# Patient Record
Sex: MALE | Race: WHITE | Employment: FULL TIME | URBAN - METROPOLITAN AREA
[De-identification: names, ages, dates, MRNs, and addresses within clinical notes are randomized per-mention and may not be internally consistent; named-entity substitution may affect disease eponyms.]

---

## 2019-10-11 ENCOUNTER — HOSPITAL ENCOUNTER (OUTPATIENT)
Age: 64
Setting detail: OUTPATIENT SURGERY
Discharge: HOME | End: 2019-10-11
Attending: UROLOGY | Admitting: UROLOGY
Payer: MEDICARE

## 2019-10-11 ENCOUNTER — ANESTHESIA EVENT (OUTPATIENT)
Dept: OPERATING ROOM | Age: 64
End: 2019-10-11
Payer: MEDICARE

## 2019-10-11 ENCOUNTER — APPOINTMENT (OUTPATIENT)
Dept: GENERAL RADIOLOGY | Age: 64
End: 2019-10-11
Payer: MEDICARE

## 2019-10-11 ENCOUNTER — HOSPITAL ENCOUNTER (EMERGENCY)
Age: 64
Discharge: HOME | End: 2019-10-12
Attending: EMERGENCY MEDICINE
Payer: MEDICARE

## 2019-10-11 ENCOUNTER — ANESTHESIA (OUTPATIENT)
Dept: OPERATING ROOM | Age: 64
End: 2019-10-11
Payer: MEDICARE

## 2019-10-11 ENCOUNTER — APPOINTMENT (OUTPATIENT)
Dept: GENERAL RADIOLOGY | Age: 64
End: 2019-10-11
Attending: UROLOGY
Payer: MEDICARE

## 2019-10-11 VITALS
RESPIRATION RATE: 16 BRPM | HEART RATE: 57 BPM | DIASTOLIC BLOOD PRESSURE: 72 MMHG | TEMPERATURE: 98.2 F | OXYGEN SATURATION: 99 % | SYSTOLIC BLOOD PRESSURE: 150 MMHG

## 2019-10-11 VITALS — SYSTOLIC BLOOD PRESSURE: 133 MMHG | OXYGEN SATURATION: 100 % | DIASTOLIC BLOOD PRESSURE: 79 MMHG | TEMPERATURE: 96.8 F

## 2019-10-11 DIAGNOSIS — G89.18 POSTOPERATIVE PAIN: Primary | ICD-10-CM

## 2019-10-11 DIAGNOSIS — R30.0 DYSURIA: Primary | ICD-10-CM

## 2019-10-11 PROCEDURE — C1769 GUIDE WIRE: HCPCS | Performed by: UROLOGY

## 2019-10-11 PROCEDURE — 6360000002 HC RX W HCPCS: Performed by: SPECIALIST

## 2019-10-11 PROCEDURE — 2709999900 HC NON-CHARGEABLE SUPPLY: Performed by: UROLOGY

## 2019-10-11 PROCEDURE — 6360000002 HC RX W HCPCS: Performed by: STUDENT IN AN ORGANIZED HEALTH CARE EDUCATION/TRAINING PROGRAM

## 2019-10-11 PROCEDURE — 81001 URINALYSIS AUTO W/SCOPE: CPT

## 2019-10-11 PROCEDURE — 2580000003 HC RX 258: Performed by: SPECIALIST

## 2019-10-11 PROCEDURE — 2580000003 HC RX 258: Performed by: ANESTHESIOLOGY

## 2019-10-11 PROCEDURE — 2500000003 HC RX 250 WO HCPCS: Performed by: SPECIALIST

## 2019-10-11 PROCEDURE — 6370000000 HC RX 637 (ALT 250 FOR IP): Performed by: STUDENT IN AN ORGANIZED HEALTH CARE EDUCATION/TRAINING PROGRAM

## 2019-10-11 PROCEDURE — 99283 EMERGENCY DEPT VISIT LOW MDM: CPT

## 2019-10-11 PROCEDURE — 3700000001 HC ADD 15 MINUTES (ANESTHESIA): Performed by: UROLOGY

## 2019-10-11 PROCEDURE — 93005 ELECTROCARDIOGRAM TRACING: CPT

## 2019-10-11 PROCEDURE — 3600000014 HC SURGERY LEVEL 4 ADDTL 15MIN: Performed by: UROLOGY

## 2019-10-11 PROCEDURE — 7100000000 HC PACU RECOVERY - FIRST 15 MIN: Performed by: UROLOGY

## 2019-10-11 PROCEDURE — 2580000003 HC RX 258: Performed by: STUDENT IN AN ORGANIZED HEALTH CARE EDUCATION/TRAINING PROGRAM

## 2019-10-11 PROCEDURE — 7100000040 HC SPAR PHASE II RECOVERY - FIRST 15 MIN: Performed by: UROLOGY

## 2019-10-11 PROCEDURE — 74018 RADEX ABDOMEN 1 VIEW: CPT

## 2019-10-11 PROCEDURE — 2580000003 HC RX 258: Performed by: UROLOGY

## 2019-10-11 PROCEDURE — 96374 THER/PROPH/DIAG INJ IV PUSH: CPT

## 2019-10-11 PROCEDURE — 96361 HYDRATE IV INFUSION ADD-ON: CPT

## 2019-10-11 PROCEDURE — C2617 STENT, NON-COR, TEM W/O DEL: HCPCS | Performed by: UROLOGY

## 2019-10-11 PROCEDURE — C1758 CATHETER, URETERAL: HCPCS | Performed by: UROLOGY

## 2019-10-11 PROCEDURE — 7100000001 HC PACU RECOVERY - ADDTL 15 MIN: Performed by: UROLOGY

## 2019-10-11 PROCEDURE — 2720000010 HC SURG SUPPLY STERILE: Performed by: UROLOGY

## 2019-10-11 PROCEDURE — 3700000000 HC ANESTHESIA ATTENDED CARE: Performed by: UROLOGY

## 2019-10-11 PROCEDURE — 3600000004 HC SURGERY LEVEL 4 BASE: Performed by: UROLOGY

## 2019-10-11 DEVICE — URETERAL STENT
Type: IMPLANTABLE DEVICE | Site: URETER | Status: FUNCTIONAL
Brand: POLARIS™ ULTRA

## 2019-10-11 RX ORDER — TAMSULOSIN HYDROCHLORIDE 0.4 MG/1
0.4 CAPSULE ORAL DAILY
Qty: 30 CAPSULE | Refills: 0 | Status: ON HOLD | OUTPATIENT
Start: 2019-10-11 | End: 2020-12-10 | Stop reason: HOSPADM

## 2019-10-11 RX ORDER — LIDOCAINE HYDROCHLORIDE 10 MG/ML
INJECTION, SOLUTION EPIDURAL; INFILTRATION; INTRACAUDAL; PERINEURAL PRN
Status: DISCONTINUED | OUTPATIENT
Start: 2019-10-11 | End: 2019-10-11 | Stop reason: SDUPTHER

## 2019-10-11 RX ORDER — FENTANYL CITRATE 50 UG/ML
50 INJECTION, SOLUTION INTRAMUSCULAR; INTRAVENOUS EVERY 5 MIN PRN
Status: DISCONTINUED | OUTPATIENT
Start: 2019-10-11 | End: 2019-10-11 | Stop reason: HOSPADM

## 2019-10-11 RX ORDER — HYDROCODONE BITARTRATE AND ACETAMINOPHEN 5; 325 MG/1; MG/1
1 TABLET ORAL PRN
Status: DISCONTINUED | OUTPATIENT
Start: 2019-10-11 | End: 2019-10-11 | Stop reason: HOSPADM

## 2019-10-11 RX ORDER — MAGNESIUM HYDROXIDE 1200 MG/15ML
LIQUID ORAL CONTINUOUS PRN
Status: COMPLETED | OUTPATIENT
Start: 2019-10-11 | End: 2019-10-11

## 2019-10-11 RX ORDER — ATORVASTATIN CALCIUM 10 MG/1
10 TABLET, FILM COATED ORAL DAILY
COMMUNITY

## 2019-10-11 RX ORDER — FENTANYL CITRATE 50 UG/ML
INJECTION, SOLUTION INTRAMUSCULAR; INTRAVENOUS PRN
Status: DISCONTINUED | OUTPATIENT
Start: 2019-10-11 | End: 2019-10-11 | Stop reason: SDUPTHER

## 2019-10-11 RX ORDER — FENTANYL CITRATE 50 UG/ML
25 INJECTION, SOLUTION INTRAMUSCULAR; INTRAVENOUS EVERY 5 MIN PRN
Status: DISCONTINUED | OUTPATIENT
Start: 2019-10-11 | End: 2019-10-11 | Stop reason: HOSPADM

## 2019-10-11 RX ORDER — SODIUM CHLORIDE 0.9 % (FLUSH) 0.9 %
10 SYRINGE (ML) INJECTION PRN
Status: DISCONTINUED | OUTPATIENT
Start: 2019-10-11 | End: 2019-10-11 | Stop reason: HOSPADM

## 2019-10-11 RX ORDER — TAMSULOSIN HYDROCHLORIDE 0.4 MG/1
0.4 CAPSULE ORAL DAILY
Status: ON HOLD | COMMUNITY
End: 2019-10-11 | Stop reason: HOSPADM

## 2019-10-11 RX ORDER — DOCUSATE SODIUM 100 MG/1
100 CAPSULE, LIQUID FILLED ORAL 2 TIMES DAILY
Qty: 60 CAPSULE | Refills: 0 | Status: SHIPPED | OUTPATIENT
Start: 2019-10-11 | End: 2019-11-10

## 2019-10-11 RX ORDER — POTASSIUM CITRATE 5 MEQ/1
10 TABLET, EXTENDED RELEASE ORAL 2 TIMES DAILY
COMMUNITY

## 2019-10-11 RX ORDER — CEPHALEXIN 500 MG/1
500 CAPSULE ORAL 3 TIMES DAILY
Qty: 9 CAPSULE | Refills: 0 | Status: SHIPPED | OUTPATIENT
Start: 2019-10-11 | End: 2019-10-14

## 2019-10-11 RX ORDER — HYDROCODONE BITARTRATE AND ACETAMINOPHEN 5; 325 MG/1; MG/1
1 TABLET ORAL EVERY 6 HOURS PRN
Status: ON HOLD | COMMUNITY
End: 2019-10-11 | Stop reason: HOSPADM

## 2019-10-11 RX ORDER — 0.9 % SODIUM CHLORIDE 0.9 %
1000 INTRAVENOUS SOLUTION INTRAVENOUS ONCE
Status: COMPLETED | OUTPATIENT
Start: 2019-10-11 | End: 2019-10-12

## 2019-10-11 RX ORDER — KETOROLAC TROMETHAMINE 15 MG/ML
15 INJECTION, SOLUTION INTRAMUSCULAR; INTRAVENOUS ONCE
Status: COMPLETED | OUTPATIENT
Start: 2019-10-11 | End: 2019-10-11

## 2019-10-11 RX ORDER — AMLODIPINE BESYLATE 5 MG/1
5 TABLET ORAL DAILY
COMMUNITY

## 2019-10-11 RX ORDER — SODIUM CHLORIDE 0.9 % (FLUSH) 0.9 %
10 SYRINGE (ML) INJECTION EVERY 12 HOURS SCHEDULED
Status: DISCONTINUED | OUTPATIENT
Start: 2019-10-11 | End: 2019-10-11 | Stop reason: HOSPADM

## 2019-10-11 RX ORDER — PROPOFOL 10 MG/ML
INJECTION, EMULSION INTRAVENOUS PRN
Status: DISCONTINUED | OUTPATIENT
Start: 2019-10-11 | End: 2019-10-11 | Stop reason: SDUPTHER

## 2019-10-11 RX ORDER — HYDROCODONE BITARTRATE AND ACETAMINOPHEN 5; 325 MG/1; MG/1
1 TABLET ORAL EVERY 6 HOURS PRN
Qty: 15 TABLET | Refills: 0 | Status: SHIPPED | OUTPATIENT
Start: 2019-10-11 | End: 2019-10-16

## 2019-10-11 RX ORDER — OXYCODONE HYDROCHLORIDE 5 MG/1
5 TABLET ORAL ONCE
Status: COMPLETED | OUTPATIENT
Start: 2019-10-11 | End: 2019-10-11

## 2019-10-11 RX ORDER — HYDROCODONE BITARTRATE AND ACETAMINOPHEN 5; 325 MG/1; MG/1
2 TABLET ORAL PRN
Status: DISCONTINUED | OUTPATIENT
Start: 2019-10-11 | End: 2019-10-11 | Stop reason: HOSPADM

## 2019-10-11 RX ORDER — ACETAMINOPHEN 325 MG/1
650 TABLET ORAL EVERY 6 HOURS PRN
COMMUNITY

## 2019-10-11 RX ORDER — SODIUM CHLORIDE, SODIUM LACTATE, POTASSIUM CHLORIDE, CALCIUM CHLORIDE 600; 310; 30; 20 MG/100ML; MG/100ML; MG/100ML; MG/100ML
INJECTION, SOLUTION INTRAVENOUS CONTINUOUS
Status: DISCONTINUED | OUTPATIENT
Start: 2019-10-11 | End: 2019-10-11 | Stop reason: HOSPADM

## 2019-10-11 RX ORDER — SODIUM CHLORIDE, SODIUM LACTATE, POTASSIUM CHLORIDE, CALCIUM CHLORIDE 600; 310; 30; 20 MG/100ML; MG/100ML; MG/100ML; MG/100ML
INJECTION, SOLUTION INTRAVENOUS CONTINUOUS PRN
Status: DISCONTINUED | OUTPATIENT
Start: 2019-10-11 | End: 2019-10-11 | Stop reason: SDUPTHER

## 2019-10-11 RX ORDER — ATENOLOL 25 MG/1
25 TABLET ORAL DAILY
COMMUNITY

## 2019-10-11 RX ADMIN — SODIUM CHLORIDE 1000 ML: 9 INJECTION, SOLUTION INTRAVENOUS at 23:34

## 2019-10-11 RX ADMIN — FENTANYL CITRATE 50 MCG: 50 INJECTION INTRAMUSCULAR; INTRAVENOUS at 10:47

## 2019-10-11 RX ADMIN — SODIUM CHLORIDE, POTASSIUM CHLORIDE, SODIUM LACTATE AND CALCIUM CHLORIDE: 600; 310; 30; 20 INJECTION, SOLUTION INTRAVENOUS at 09:30

## 2019-10-11 RX ADMIN — OXYCODONE HYDROCHLORIDE 5 MG: 5 TABLET ORAL at 23:38

## 2019-10-11 RX ADMIN — Medication 2 G: at 10:49

## 2019-10-11 RX ADMIN — SODIUM CHLORIDE, POTASSIUM CHLORIDE, SODIUM LACTATE AND CALCIUM CHLORIDE: 600; 310; 30; 20 INJECTION, SOLUTION INTRAVENOUS at 10:38

## 2019-10-11 RX ADMIN — PROPOFOL 200 MG: 10 INJECTION, EMULSION INTRAVENOUS at 10:47

## 2019-10-11 RX ADMIN — FENTANYL CITRATE 25 MCG: 50 INJECTION INTRAMUSCULAR; INTRAVENOUS at 11:11

## 2019-10-11 RX ADMIN — KETOROLAC TROMETHAMINE 15 MG: 15 INJECTION, SOLUTION INTRAMUSCULAR; INTRAVENOUS at 23:34

## 2019-10-11 RX ADMIN — LIDOCAINE HYDROCHLORIDE 50 MG: 10 INJECTION, SOLUTION EPIDURAL; INFILTRATION; INTRACAUDAL; PERINEURAL at 10:47

## 2019-10-11 SDOH — HEALTH STABILITY: MENTAL HEALTH: HOW OFTEN DO YOU HAVE A DRINK CONTAINING ALCOHOL?: NEVER

## 2019-10-11 ASSESSMENT — PULMONARY FUNCTION TESTS
PIF_VALUE: 0
PIF_VALUE: 18
PIF_VALUE: 23
PIF_VALUE: 18
PIF_VALUE: 17
PIF_VALUE: 12
PIF_VALUE: 19
PIF_VALUE: 20
PIF_VALUE: 20
PIF_VALUE: 2
PIF_VALUE: 16
PIF_VALUE: 20
PIF_VALUE: 15
PIF_VALUE: 3
PIF_VALUE: 16
PIF_VALUE: 2
PIF_VALUE: 17
PIF_VALUE: 2
PIF_VALUE: 16
PIF_VALUE: 16
PIF_VALUE: 3
PIF_VALUE: 21
PIF_VALUE: 2
PIF_VALUE: 3
PIF_VALUE: 17
PIF_VALUE: 19
PIF_VALUE: 18
PIF_VALUE: 4
PIF_VALUE: 21
PIF_VALUE: 20
PIF_VALUE: 19
PIF_VALUE: 18
PIF_VALUE: 17
PIF_VALUE: 20
PIF_VALUE: 3
PIF_VALUE: 20
PIF_VALUE: 16
PIF_VALUE: 23
PIF_VALUE: 2
PIF_VALUE: 0
PIF_VALUE: 16
PIF_VALUE: 20
PIF_VALUE: 20
PIF_VALUE: 23
PIF_VALUE: 20
PIF_VALUE: 19
PIF_VALUE: 3

## 2019-10-11 ASSESSMENT — PAIN SCALES - GENERAL
PAINLEVEL_OUTOF10: 0
PAINLEVEL_OUTOF10: 6
PAINLEVEL_OUTOF10: 0

## 2019-10-11 ASSESSMENT — PAIN - FUNCTIONAL ASSESSMENT: PAIN_FUNCTIONAL_ASSESSMENT: 0-10

## 2019-10-11 ASSESSMENT — PAIN DESCRIPTION - ORIENTATION: ORIENTATION: LEFT;LOWER

## 2019-10-11 ASSESSMENT — PAIN DESCRIPTION - LOCATION: LOCATION: BACK

## 2019-10-11 ASSESSMENT — PAIN DESCRIPTION - PAIN TYPE: TYPE: ACUTE PAIN

## 2019-10-12 VITALS
TEMPERATURE: 96.8 F | HEART RATE: 70 BPM | SYSTOLIC BLOOD PRESSURE: 152 MMHG | BODY MASS INDEX: 29.16 KG/M2 | DIASTOLIC BLOOD PRESSURE: 89 MMHG | HEIGHT: 73 IN | OXYGEN SATURATION: 98 % | WEIGHT: 220 LBS | RESPIRATION RATE: 16 BRPM

## 2019-10-12 LAB
-: ABNORMAL
AMORPHOUS: ABNORMAL
BACTERIA: ABNORMAL
BILIRUBIN URINE: NEGATIVE
CASTS UA: ABNORMAL /LPF (ref 0–2)
COLOR: ABNORMAL
CRYSTALS, UA: ABNORMAL /HPF
EPITHELIAL CELLS UA: ABNORMAL /HPF (ref 0–5)
GLUCOSE URINE: NEGATIVE
KETONES, URINE: NEGATIVE
LEUKOCYTE ESTERASE, URINE: ABNORMAL
MUCUS: ABNORMAL
NITRITE, URINE: NEGATIVE
OTHER OBSERVATIONS UA: ABNORMAL
PH UA: 6.5 (ref 5–8)
PROTEIN UA: ABNORMAL
RBC UA: ABNORMAL /HPF (ref 0–2)
RENAL EPITHELIAL, UA: ABNORMAL /HPF
SPECIFIC GRAVITY UA: 1.02 (ref 1–1.03)
TRICHOMONAS: ABNORMAL
TURBIDITY: ABNORMAL
URINE HGB: ABNORMAL
UROBILINOGEN, URINE: NORMAL
WBC UA: ABNORMAL /HPF (ref 0–5)
YEAST: ABNORMAL

## 2019-10-12 PROCEDURE — 6370000000 HC RX 637 (ALT 250 FOR IP): Performed by: STUDENT IN AN ORGANIZED HEALTH CARE EDUCATION/TRAINING PROGRAM

## 2019-10-12 RX ORDER — OXYBUTYNIN CHLORIDE 5 MG/1
5 TABLET, EXTENDED RELEASE ORAL 3 TIMES DAILY
Qty: 9 TABLET | Refills: 0 | Status: SHIPPED | OUTPATIENT
Start: 2019-10-12 | End: 2019-10-15

## 2019-10-12 RX ORDER — PHENAZOPYRIDINE HYDROCHLORIDE 100 MG/1
100 TABLET, FILM COATED ORAL 3 TIMES DAILY PRN
Qty: 3 TABLET | Refills: 0 | Status: SHIPPED | OUTPATIENT
Start: 2019-10-12 | End: 2019-10-15

## 2019-10-12 RX ORDER — OXYBUTYNIN CHLORIDE 5 MG/1
5 TABLET ORAL ONCE
Status: COMPLETED | OUTPATIENT
Start: 2019-10-12 | End: 2019-10-12

## 2019-10-12 RX ORDER — PHENAZOPYRIDINE HYDROCHLORIDE 100 MG/1
100 TABLET, FILM COATED ORAL ONCE
Status: COMPLETED | OUTPATIENT
Start: 2019-10-12 | End: 2019-10-12

## 2019-10-12 RX ADMIN — PHENAZOPYRIDINE HYDROCHLORIDE 100 MG: 100 TABLET ORAL at 00:17

## 2019-10-12 RX ADMIN — OXYBUTYNIN CHLORIDE 5 MG: 5 TABLET ORAL at 00:17

## 2019-10-13 LAB
EKG ATRIAL RATE: 66 BPM
EKG P AXIS: 62 DEGREES
EKG P-R INTERVAL: 170 MS
EKG Q-T INTERVAL: 436 MS
EKG QRS DURATION: 92 MS
EKG QTC CALCULATION (BAZETT): 457 MS
EKG R AXIS: 12 DEGREES
EKG T AXIS: 46 DEGREES
EKG VENTRICULAR RATE: 66 BPM

## 2020-02-13 ENCOUNTER — HOSPITAL ENCOUNTER (OUTPATIENT)
Dept: ULTRASOUND IMAGING | Age: 65
Discharge: HOME | End: 2020-02-15
Payer: MEDICARE

## 2020-02-13 PROCEDURE — 76775 US EXAM ABDO BACK WALL LIM: CPT

## 2020-12-10 ENCOUNTER — ANESTHESIA (OUTPATIENT)
Dept: OPERATING ROOM | Age: 65
End: 2020-12-10
Payer: MEDICARE

## 2020-12-10 ENCOUNTER — APPOINTMENT (OUTPATIENT)
Dept: GENERAL RADIOLOGY | Age: 65
End: 2020-12-10
Attending: UROLOGY
Payer: MEDICARE

## 2020-12-10 ENCOUNTER — HOSPITAL ENCOUNTER (OUTPATIENT)
Age: 65
Setting detail: OUTPATIENT SURGERY
Discharge: HOME | End: 2020-12-10
Attending: UROLOGY | Admitting: UROLOGY
Payer: MEDICARE

## 2020-12-10 ENCOUNTER — ANESTHESIA EVENT (OUTPATIENT)
Dept: OPERATING ROOM | Age: 65
End: 2020-12-10
Payer: MEDICARE

## 2020-12-10 VITALS
SYSTOLIC BLOOD PRESSURE: 136 MMHG | HEIGHT: 73 IN | BODY MASS INDEX: 28.1 KG/M2 | TEMPERATURE: 97.9 F | HEART RATE: 67 BPM | OXYGEN SATURATION: 99 % | WEIGHT: 212 LBS | RESPIRATION RATE: 17 BRPM | DIASTOLIC BLOOD PRESSURE: 77 MMHG

## 2020-12-10 VITALS — SYSTOLIC BLOOD PRESSURE: 90 MMHG | DIASTOLIC BLOOD PRESSURE: 65 MMHG | OXYGEN SATURATION: 100 %

## 2020-12-10 PROCEDURE — 7100000000 HC PACU RECOVERY - FIRST 15 MIN: Performed by: UROLOGY

## 2020-12-10 PROCEDURE — 3700000000 HC ANESTHESIA ATTENDED CARE: Performed by: UROLOGY

## 2020-12-10 PROCEDURE — 3600000013 HC SURGERY LEVEL 3 ADDTL 15MIN: Performed by: UROLOGY

## 2020-12-10 PROCEDURE — 2709999900 HC NON-CHARGEABLE SUPPLY: Performed by: UROLOGY

## 2020-12-10 PROCEDURE — 3700000001 HC ADD 15 MINUTES (ANESTHESIA): Performed by: UROLOGY

## 2020-12-10 PROCEDURE — 2580000003 HC RX 258: Performed by: UROLOGY

## 2020-12-10 PROCEDURE — C2617 STENT, NON-COR, TEM W/O DEL: HCPCS | Performed by: UROLOGY

## 2020-12-10 PROCEDURE — 3209999900 FLUORO FOR SURGICAL PROCEDURES

## 2020-12-10 PROCEDURE — 2500000003 HC RX 250 WO HCPCS: Performed by: NURSE ANESTHETIST, CERTIFIED REGISTERED

## 2020-12-10 PROCEDURE — 2580000003 HC RX 258: Performed by: NURSE ANESTHETIST, CERTIFIED REGISTERED

## 2020-12-10 PROCEDURE — 7100000001 HC PACU RECOVERY - ADDTL 15 MIN: Performed by: UROLOGY

## 2020-12-10 PROCEDURE — 2580000003 HC RX 258: Performed by: ANESTHESIOLOGY

## 2020-12-10 PROCEDURE — C1758 CATHETER, URETERAL: HCPCS | Performed by: UROLOGY

## 2020-12-10 PROCEDURE — 3600000003 HC SURGERY LEVEL 3 BASE: Performed by: UROLOGY

## 2020-12-10 PROCEDURE — C1769 GUIDE WIRE: HCPCS | Performed by: UROLOGY

## 2020-12-10 PROCEDURE — 6360000002 HC RX W HCPCS: Performed by: NURSE ANESTHETIST, CERTIFIED REGISTERED

## 2020-12-10 PROCEDURE — 7100000010 HC PHASE II RECOVERY - FIRST 15 MIN: Performed by: UROLOGY

## 2020-12-10 PROCEDURE — 82365 CALCULUS SPECTROSCOPY: CPT

## 2020-12-10 DEVICE — STENT URET 6FR L26CM PERCFLX HYDR+ TAPR TIP GRAD: Type: IMPLANTABLE DEVICE | Site: URETER | Status: FUNCTIONAL

## 2020-12-10 RX ORDER — LIDOCAINE HYDROCHLORIDE 10 MG/ML
INJECTION, SOLUTION EPIDURAL; INFILTRATION; INTRACAUDAL; PERINEURAL PRN
Status: DISCONTINUED | OUTPATIENT
Start: 2020-12-10 | End: 2020-12-10 | Stop reason: SDUPTHER

## 2020-12-10 RX ORDER — MIDAZOLAM HYDROCHLORIDE 1 MG/ML
INJECTION INTRAMUSCULAR; INTRAVENOUS PRN
Status: DISCONTINUED | OUTPATIENT
Start: 2020-12-10 | End: 2020-12-10 | Stop reason: SDUPTHER

## 2020-12-10 RX ORDER — ONDANSETRON 2 MG/ML
4 INJECTION INTRAMUSCULAR; INTRAVENOUS
Status: DISCONTINUED | OUTPATIENT
Start: 2020-12-10 | End: 2020-12-10 | Stop reason: HOSPADM

## 2020-12-10 RX ORDER — KETOROLAC TROMETHAMINE 30 MG/ML
INJECTION, SOLUTION INTRAMUSCULAR; INTRAVENOUS PRN
Status: DISCONTINUED | OUTPATIENT
Start: 2020-12-10 | End: 2020-12-10 | Stop reason: SDUPTHER

## 2020-12-10 RX ORDER — CEFAZOLIN SODIUM 2 G/50ML
SOLUTION INTRAVENOUS PRN
Status: DISCONTINUED | OUTPATIENT
Start: 2020-12-10 | End: 2020-12-10 | Stop reason: SDUPTHER

## 2020-12-10 RX ORDER — PROPOFOL 10 MG/ML
INJECTION, EMULSION INTRAVENOUS CONTINUOUS PRN
Status: DISCONTINUED | OUTPATIENT
Start: 2020-12-10 | End: 2020-12-10 | Stop reason: SDUPTHER

## 2020-12-10 RX ORDER — TAMSULOSIN HYDROCHLORIDE 0.4 MG/1
0.4 CAPSULE ORAL DAILY
Qty: 30 CAPSULE | Refills: 0 | Status: SHIPPED | OUTPATIENT
Start: 2020-12-10 | End: 2020-12-20

## 2020-12-10 RX ORDER — MEPERIDINE HYDROCHLORIDE 50 MG/ML
12.5 INJECTION INTRAMUSCULAR; INTRAVENOUS; SUBCUTANEOUS EVERY 5 MIN PRN
Status: DISCONTINUED | OUTPATIENT
Start: 2020-12-10 | End: 2020-12-10 | Stop reason: HOSPADM

## 2020-12-10 RX ORDER — FENTANYL CITRATE 50 UG/ML
50 INJECTION, SOLUTION INTRAMUSCULAR; INTRAVENOUS EVERY 5 MIN PRN
Status: DISCONTINUED | OUTPATIENT
Start: 2020-12-10 | End: 2020-12-10 | Stop reason: HOSPADM

## 2020-12-10 RX ORDER — MAGNESIUM HYDROXIDE 1200 MG/15ML
LIQUID ORAL PRN
Status: DISCONTINUED | OUTPATIENT
Start: 2020-12-10 | End: 2020-12-10 | Stop reason: ALTCHOICE

## 2020-12-10 RX ORDER — SODIUM CHLORIDE, SODIUM LACTATE, POTASSIUM CHLORIDE, CALCIUM CHLORIDE 600; 310; 30; 20 MG/100ML; MG/100ML; MG/100ML; MG/100ML
INJECTION, SOLUTION INTRAVENOUS CONTINUOUS PRN
Status: DISCONTINUED | OUTPATIENT
Start: 2020-12-10 | End: 2020-12-10 | Stop reason: SDUPTHER

## 2020-12-10 RX ORDER — FENTANYL CITRATE 50 UG/ML
25 INJECTION, SOLUTION INTRAMUSCULAR; INTRAVENOUS EVERY 5 MIN PRN
Status: DISCONTINUED | OUTPATIENT
Start: 2020-12-10 | End: 2020-12-10 | Stop reason: HOSPADM

## 2020-12-10 RX ORDER — PROPOFOL 10 MG/ML
INJECTION, EMULSION INTRAVENOUS PRN
Status: DISCONTINUED | OUTPATIENT
Start: 2020-12-10 | End: 2020-12-10 | Stop reason: SDUPTHER

## 2020-12-10 RX ORDER — DEXAMETHASONE SODIUM PHOSPHATE 4 MG/ML
INJECTION, SOLUTION INTRA-ARTICULAR; INTRALESIONAL; INTRAMUSCULAR; INTRAVENOUS; SOFT TISSUE PRN
Status: DISCONTINUED | OUTPATIENT
Start: 2020-12-10 | End: 2020-12-10 | Stop reason: SDUPTHER

## 2020-12-10 RX ORDER — PHENYLEPHRINE HYDROCHLORIDE 10 MG/ML
INJECTION INTRAVENOUS PRN
Status: DISCONTINUED | OUTPATIENT
Start: 2020-12-10 | End: 2020-12-10 | Stop reason: SDUPTHER

## 2020-12-10 RX ORDER — MAGNESIUM HYDROXIDE 1200 MG/15ML
LIQUID ORAL CONTINUOUS PRN
Status: COMPLETED | OUTPATIENT
Start: 2020-12-10 | End: 2020-12-10

## 2020-12-10 RX ORDER — ONDANSETRON 2 MG/ML
INJECTION INTRAMUSCULAR; INTRAVENOUS PRN
Status: DISCONTINUED | OUTPATIENT
Start: 2020-12-10 | End: 2020-12-10 | Stop reason: SDUPTHER

## 2020-12-10 RX ORDER — HYDROCODONE BITARTRATE AND ACETAMINOPHEN 5; 325 MG/1; MG/1
1 TABLET ORAL EVERY 4 HOURS PRN
Qty: 12 TABLET | Refills: 0 | Status: SHIPPED | OUTPATIENT
Start: 2020-12-10 | End: 2020-12-13

## 2020-12-10 RX ORDER — CEPHALEXIN 500 MG/1
500 CAPSULE ORAL 3 TIMES DAILY
Qty: 9 CAPSULE | Refills: 0 | Status: SHIPPED | OUTPATIENT
Start: 2020-12-10 | End: 2020-12-13

## 2020-12-10 RX ORDER — SODIUM CHLORIDE, SODIUM LACTATE, POTASSIUM CHLORIDE, CALCIUM CHLORIDE 600; 310; 30; 20 MG/100ML; MG/100ML; MG/100ML; MG/100ML
INJECTION, SOLUTION INTRAVENOUS CONTINUOUS
Status: DISCONTINUED | OUTPATIENT
Start: 2020-12-10 | End: 2020-12-10 | Stop reason: HOSPADM

## 2020-12-10 RX ADMIN — SODIUM CHLORIDE, POTASSIUM CHLORIDE, SODIUM LACTATE AND CALCIUM CHLORIDE: 600; 310; 30; 20 INJECTION, SOLUTION INTRAVENOUS at 10:55

## 2020-12-10 RX ADMIN — MIDAZOLAM HYDROCHLORIDE 2 MG: 1 INJECTION, SOLUTION INTRAMUSCULAR; INTRAVENOUS at 10:55

## 2020-12-10 RX ADMIN — SODIUM CHLORIDE, POTASSIUM CHLORIDE, SODIUM LACTATE AND CALCIUM CHLORIDE: 600; 310; 30; 20 INJECTION, SOLUTION INTRAVENOUS at 10:09

## 2020-12-10 RX ADMIN — PROPOFOL 60 MG: 10 INJECTION, EMULSION INTRAVENOUS at 11:00

## 2020-12-10 RX ADMIN — PROPOFOL 100 MCG/KG/MIN: 10 INJECTION, EMULSION INTRAVENOUS at 10:58

## 2020-12-10 RX ADMIN — ONDANSETRON 4 MG: 2 INJECTION INTRAMUSCULAR; INTRAVENOUS at 11:23

## 2020-12-10 RX ADMIN — PHENYLEPHRINE HYDROCHLORIDE 100 MCG: 10 INJECTION INTRAVENOUS at 11:41

## 2020-12-10 RX ADMIN — DEXAMETHASONE SODIUM PHOSPHATE 4 MG: 4 INJECTION, SOLUTION INTRAMUSCULAR; INTRAVENOUS at 11:23

## 2020-12-10 RX ADMIN — CEFAZOLIN SODIUM 2 G: 2 SOLUTION INTRAVENOUS at 11:10

## 2020-12-10 RX ADMIN — PHENYLEPHRINE HYDROCHLORIDE 100 MCG: 10 INJECTION INTRAVENOUS at 11:38

## 2020-12-10 RX ADMIN — LIDOCAINE HYDROCHLORIDE 50 MG: 10 INJECTION, SOLUTION EPIDURAL; INFILTRATION; INTRACAUDAL; PERINEURAL at 10:56

## 2020-12-10 RX ADMIN — KETOROLAC TROMETHAMINE 30 MG: 30 INJECTION, SOLUTION INTRAMUSCULAR at 11:33

## 2020-12-10 ASSESSMENT — PULMONARY FUNCTION TESTS
PIF_VALUE: 1
PIF_VALUE: 3
PIF_VALUE: 1
PIF_VALUE: 2
PIF_VALUE: 1
PIF_VALUE: 1
PIF_VALUE: 3
PIF_VALUE: 2
PIF_VALUE: 1
PIF_VALUE: 1
PIF_VALUE: 3
PIF_VALUE: 0
PIF_VALUE: 13
PIF_VALUE: 1
PIF_VALUE: 2
PIF_VALUE: 1
PIF_VALUE: 2
PIF_VALUE: 1
PIF_VALUE: 3
PIF_VALUE: 3
PIF_VALUE: 1
PIF_VALUE: 2
PIF_VALUE: 0
PIF_VALUE: 2
PIF_VALUE: 0
PIF_VALUE: 3
PIF_VALUE: 4
PIF_VALUE: 1
PIF_VALUE: 1
PIF_VALUE: 3
PIF_VALUE: 2
PIF_VALUE: 6
PIF_VALUE: 1
PIF_VALUE: 0
PIF_VALUE: 1

## 2020-12-10 ASSESSMENT — PAIN SCALES - GENERAL
PAINLEVEL_OUTOF10: 0

## 2020-12-10 ASSESSMENT — PAIN - FUNCTIONAL ASSESSMENT: PAIN_FUNCTIONAL_ASSESSMENT: 0-10

## 2020-12-10 NOTE — OP NOTE
Operative Note    NAME: Sully Inman   MRN: [id number]  : 1955  PROCEDURE DATE: 12/10/20    Surgeon: Elie Gloria MD    Resident(s): Aristeo Mackey MD-PGY4    Pre-op Diagnosis: Right obstructing 4 mm distal ureteral calculus    Post-op Diagnosis: Passed 4 mm distal ureteral calculus (into bladder)     Procedure:   1. Cystourethroscopy. 2. Right-sided diagnostic semirigid ureteroscopy. 3. Right-sided ureteral stent placement. 4. Simple removal of bladder stone. Anesthesia: General    Antibiotics: 2000 mg IV Ancef         Indications:   Sully Inman is a 76 y.o. male with a history of nephrolithiasis who presents with obstructing right ureteral calculus and was offered definitive treatment. He did previously have Covid and was presenting with cough and thus Covid precautions were taken in both pre-/postop and intraoperatively. Risks, alternatives, and benefits were discussed and the patient elected to proceed. Informed consent was obtained. Findings: Passed ureteral stone noted in the bladder and no evidence of residual stone in the right ureter    Procedure details: The patient was brought back from the preoperative holding area to the  operating room, and was transferred to the operating table. Covid precautions were undertaken. General anesthesia was induced appropriately. Preoperative antibiotics were given and EPC cuffs were placed and confirmed to be working. The patient was placed in dorsal lithotomy position and prepped and draped in the usual sterile fashion. Surgical timeout confirming patient, procedure, and positioning was performed. We began by inserting a rigid cystoscope with a 22 Croatian sheath and 30 degree lens into the patient's urethral meatus and advancing into the bladder without complication.   A complete cystoscopic evaluation of the bladder was performed in stepwise manner evaluating dome, left lateral wall, trigone/floor, posterior wall, and right lateral wall.  No gross abnormalities were identified. We did note a stone at the floor of the bladder consistent with patient's right ureteral stone. Although this was likely the stone that had been instructing, we elected to survey the ureter to make sure there was no other stone. We then focused our attention on the right ureteral orifice, which we cannulated with our 0.035 glidewire, and advanced up to renal pelvis. We then used a dual lumen sheath to place a second wire, which was appropriately secured. Once in good position, we advanced our semirigid ureteroscope over the working wire to the UPJ under direct visualization. No further stone was identified. We withdrew the ureteroscope and visualized the entire ureter. No stone fragments were identified and no damage to the ureter was identified. The flexible ureteroscope was then removed and a 6 [place] Laina x26 cm ureteral stent with strings was advanced over the wire into the kidney. We noted appropriate placement in the upper collecting system using fluoroscopy. There was a good curl noted in the bladder under cystoscopic visualization. The patient's bladder was drained, stone was removed through the scope, and the cystoscope was removed. Ishaa Deepak was sent for analysis. The procedure was subsequently terminated. Stent was secured with strings on the penis using benzoin and Steri-Strips. Patient tolerated procedure well without complication and was taken to PACU in good and stable condition after anesthesia was reversed. Dr. Emerson Valero was present and scrubbed for the duration of the procedure. Complications: None     Drains: None    Specimens: Stone for analysis    Implants: Right 6 x 26 double-J ureteral stent with strings     Estimated Blood Loss: Minimal    IV Fluids: Crystalloid      Condition: Good and stable    Disposition: PACU    Plan: Patient will be discharged from the postoperative recovery area and was instructed to remove stent in 3 days.     Yogi

## 2020-12-10 NOTE — ANESTHESIA PRE PROCEDURE
results found for: PHART, PO2ART, DJL0LHI, FPT7LUC, BEART, O3HIHHBA     Type & Screen (If Applicable):  No results found for: LABABO, [address]    Anesthesia Evaluation  Patient summary reviewed no history of anesthetic complications:   Airway: Mallampati: II  TM distance: >3 FB   Neck ROM: full  Mouth opening: > = 3 FB Dental: normal exam         Pulmonary:Negative Pulmonary ROS and normal exam                               Cardiovascular:    (+) hypertension: no interval change, hyperlipidemia      ECG reviewed               Beta Blocker:  Not on Beta Blocker         Neuro/Psych:   Negative Neuro/Psych ROS              GI/Hepatic/Renal: Neg GI/Hepatic/Renal ROS  (+) renal disease: kidney stones,           Endo/Other: Negative Endo/Other ROS             Pt had PAT visit. Abdominal:           Vascular: negative vascular ROS. Anesthesia Plan      MAC     ASA 3     (COVID +  Discharged recently from [facility]  No recent [place] test, patient has moderately severe cough  Will proceed with MAC anesthesia in OR 3 with negative pressure ventilation)  Induction: intravenous. MIPS: Postoperative opioids intended. Anesthetic plan and risks discussed with patient. Plan discussed with CRNA.                 Sultana Barnard MD   12/10/2020

## 2020-12-10 NOTE — H&P
Paul Sarkar MD, Marilee Gamble MD, Chi Barroso MD, Gt Mcclure MD, Gwyn Ariza MD, Rashmi Beverly MD, & Kriss Holt MD    Urology History & Physical      Patient:  Brie Young  MRN: [id number]  YOB: 1955    CHIEF COMPLAINT:  Right ureteral stone     HISTORY OF PRESENT ILLNESS:   Brie Young is a 76 y.o. male who presents with right ureteral stone. Here for definitive treatment. Patient's old records, notes and chart reviewed and summarized above.     Past Medical History:    Past Medical History:   Diagnosis Date    Hyperlipidemia     Hypertension        Past Surgical History:    Past Surgical History:   Procedure Laterality Date    COLONOSCOPY      CYSTO/URETERO/PYELOSCOPY, CALCULUS TX Left 10/11/2019    FORTEC HOLMIUM LASER, CYSTSCOPY, URETEROSCOPY, STENT PLACEMENT Binh Valero CONF# [id number] - LAURA) performed by Lea Farias MD at [address] [place] [address]. Left 10/11/2019     HOLMIUM LASER, CYSTSCOPY, URETEROSCOPY, STENT PLACEMENT     CYSTOURETHROSCOPY Right 12/10/2020    stent    RECTAL SURGERY      hemmorhoids    SKIN BIOPSY      right ear biopsy       Medications:      Current Facility-Administered Medications:     lactated ringers infusion, , Intravenous, Continuous, Giovanni Broussard MD, Last Rate: 100 mL/hr at 12/10/20 1009    ondansetron (ZOFRAN) injection 4 mg, 4 mg, Intravenous, Once PRN, Nahid Gil MD    meperidine (DEMEROL) injection 12.5 mg, 12.5 mg, Intravenous, Q5 Min PRN, Nahid Gil MD    fentaNYL (SUBLIMAZE) injection 25 mcg, 25 mcg, Intravenous, Q5 Min PRN, Giovanni Broussard MD    fentaNYL (SUBLIMAZE) injection 50 mcg, 50 mcg, Intravenous, Q5 Min PRN, Giovanni Broussard MD    sodium chloride 0.9 % irrigation, , , Continuous PRN, Lea Farias MD, 3,000 mL at 12/10/20 1121    sterile water for irrigation, , , PRN, Lea Farias MD, 500 mL at 12/10/20 1122    Facility-Administered Medications Ordered in Other Encounters:    propofol injection, , , PRN, Amparo Vailing, APRN - CRNA, 60 mg at 12/10/20 1100    propofol injection, , , Continuous PRN, Amparo Vailing, APRN - CRNA, Stopped at 12/10/20 1123    lidocaine PF 1 % injection, , , PRN, Amparo Riling, APRN - CRNA, 50 mg at 12/10/20 1056    midazolam (VERSED) injection, , , PRN, Amparo Vailing, APRN - CRNA, 2 mg at 12/10/20 1055    lactated ringers infusion, , , Continuous PRN, Amparo Riling, APRN - CRNA    ceFAZolin (ANCEF) 2 g in dextrose 3 % 50 mL IVPB (duplex), , , PRN, Amparo Vailing, APRN - CRNA, 2 g at 12/10/20 1110    dexamethasone (DECADRON) injection, , , PRN, Amparo Vailing, APRN - CRNA, 4 mg at 12/10/20 1123    ondansetron (Senora Pottier) injection, , , PRN, Amparo Vailing, APRN - CRNA, 4 mg at 12/10/20 1123    ketorolac (TORADOL) injection, , , PRN, Amparo Vailing, APRN - CRNA, 30 mg at 12/10/20 1133    phenylephrine (VAZCULEP) injection, , , PRN, Amparo Vailing, APRN - CRNA, 100 mcg at 12/10/20 1138    Allergies:  No Known Allergies    Social History:   Social History     Socioeconomic History    Marital status:      Spouse name: Not on file    Number of children: Not on file    Years of education: Not on file    Highest education level: Not on file   Occupational History    Not on file   Social Needs    Financial resource strain: Not on file    Food insecurity     Worry: Not on file     Inability: Not on file   [employer] needs     Medical: Not on file     Non-medical: Not on file   Tobacco Use    Smoking status: Never Smoker    Smokeless tobacco: Never Used   Substance and Sexual Activity    Alcohol use: Never     Frequency: Never    Drug use: Never    Sexual activity: Not on file   Lifestyle    Physical activity     Days per week: Not on file     Minutes per session: Not on file    Stress: Not on file   Relationships    Social connections     Talks on phone: Not on file     Gets together: Not on file     Attends [employer] service: Not on file Active member of club or organization: Not on file     Attends meetings of clubs or organizations: Not on file     Relationship status: Not on file    Intimate partner violence     Fear of current or ex partner: Not on file     Emotionally abused: Not on file     Physically abused: Not on file     Forced sexual activity: Not on file   Other Topics Concern    Not on file   Social History Narrative    Not on file       Family History:  No family history on file. REVIEW OF SYSTEMS:  A comprehensive 14 point review of systems was obtained. Constitutional: No fatigue  Eyes: No blurry vision  Ears, nose, mouth, throat, face: No ringing in the ears; no facial droop. Respiratory: No cough or cold. Cardiovascular: No palpitations  Gastrointestinal: No diarrhea or constipation. Genitourinary: No burning with urination  Integument/Skin: No rashes  Hematologic/Lymphatic: No easy bruising  Musculoskeletal: No muscle pains  Neurologic: No weakness in the extremities. Psychiatric: No depression or suicidal thoughts. Endocrine: No heat or cold intolerances. Allergic/Immunologic: No current seasonal allergies; no skin hives. Physical Exam:      Vitals:    12/10/20 0953   BP: (!) 151/91   Pulse: 76   Resp: 16   Temp: 96.8 °F (36 °C)   SpO2: 98%     Constitutional: Patient in no acute distress. Neuro: alert and oriented to person place and time. Head: Atraumatic and normocephalic. Neck: Trachea midline   Ext: 2+ radial pulses bilaterally. Psych: Mood and affect normal.  Skin: No rashes or bruising present. Lungs: Respiratory effort normal.  Cardiovascular:  Regular rhythm. Abdomen: Soft, non-tender, non-distended. Labs:  No results for input(s): WBC, HGB, HCT, MCV, PLT in the last 72 hours. No results for input(s): NA, K, CL, CO2, PHOS, BUN, CREATININE in the last 72 hours.     Invalid input(s): CA    No results for input(s): COLORU, PHUR, LABCAST, WBCUA, RBCUA, MUCUS, TRICHOMONAS, YEAST, BACTERIA, CLARITYU, SPECGRAV, LEUKOCYTESUR, UROBILINOGEN, [place] Mean in the last 72 hours. Invalid input(s): NITRATE, GLUCOSEUKETONESUAMORPHOUS    Culture Results:        -----------------------------------------------------------------  Imaging Results:  No results found.     Assessment and Plan   Assessment:  Suleiman Da Silva is a 76 y.o. male who presents with:  Right ureteral stone  Recent COVID+ with cough       Plan:   OR for cystoscopy, R ureteroscopy, laser, stent placement   COVID precautions      León Meehan MD  PGY-4, [facility] Department of Urology   11:40 AM 12/10/2020

## 2020-12-10 NOTE — BRIEF OP NOTE
Brief Postoperative Note      Patient: Gillian Randhawa  YOB: 1955  MRN: [id number]    Date of Procedure: 12/10/2020    Pre-Op Diagnosis: RIGHT URETERAL STONE    Post-Op Diagnosis: Same       Procedure(s):  CYSTOSCOPY,  URETERAL STENT INSERTION, URETEROSCOPY    Surgeon(s):  Rambo Espinal MD    Assistant:  Resident: Darlin Hashimoto, MD    Anesthesia: General    Estimated Blood Loss (mL): Minimal    Complications: None    Specimens:   ID Type Source Tests Collected by Time Destination   1 : STONE FOR ANALYSIS Stone (Calculus) Kidney Ricki Rubalcava MD 12/10/2020 1130        Implants:  Implant Name Type Inv.  Item Serial No.  Lot No. LRB No. Used Action   STENT URET 6FR L26CM PERCFLX HYDR+ TAPR TIP Lisa Speed 6FR L26CM PERCFLX HYDR+ TAPR Elsie Caldron  [employer] [facility]- [id number] Right 1 Implanted         Drains: * No LDAs found *    Findings: Stone passed into bladder    Electronically signed by Darlin Hashimoto, MD on 12/10/2020 at 11:47 AM

## 2020-12-10 NOTE — ANESTHESIA POSTPROCEDURE EVALUATION
Department of Anesthesiology  Postprocedure Note    Patient: Zack Fisher  MRN: [id number]  [place]: 1955  Date of evaluation: 12/10/2020  Time:  1:53 PM     Procedure Summary     Date:  12/10/20 Room / Location:  [address]    Anesthesia Start:  1055 Anesthesia Stop:  1159    Procedure:  CYSTOSCOPY,  URETERAL STENT INSERTION (Right ) Diagnosis:  (RIGHT URETERAL STONE)    Surgeon:  Celia Prieto MD Responsible Provider:  Concha Vee MD    Anesthesia Type:  General ASA Status:  2          Anesthesia Type: General    Ventura Phase I: Ventura Score: 9    Ventura Phase II: Ventura Score: 9    Last vitals: Reviewed and per EMR flowsheets.      POST-OP ANESTHESIA NOTE       /77   Pulse 67   Temp 97.9 °F (36.6 °C)   Resp 17   Ht 6' 1\" (1.854 m)   Wt 212 lb (96.2 kg)   SpO2 99%   BMI 27.97 kg/m²    Pain Assessment: 0-10  Pain Level: 0           Anesthesia Post Evaluation    Patient location during evaluation: PACU  Patient participation: complete - patient participated  Level of consciousness: awake  Pain score: 0  Airway patency: patent  Nausea & Vomiting: no vomiting and no nausea  Complications: no  Cardiovascular status: hemodynamically stable  Respiratory status: acceptable  Hydration status: stable

## 2020-12-15 LAB
STONE COMPOSITION: NORMAL
STONE DESCRIPTION: NORMAL
STONE MASS: 17 MG
STONE NUMBER: 1
STONE SIZE: NORMAL MM

## 2023-05-16 ENCOUNTER — ANESTHESIA EVENT (OUTPATIENT)
Dept: OPERATING ROOM | Age: 68
End: 2023-05-16
Payer: MEDICARE

## 2023-05-16 RX ORDER — FERROUS SULFATE 325(65) MG
325 TABLET ORAL
COMMUNITY

## 2023-05-16 RX ORDER — CIPROFLOXACIN 500 MG/1
500 TABLET, FILM COATED ORAL 2 TIMES DAILY
COMMUNITY
Start: 2023-05-16 | End: 2023-05-19

## 2023-05-16 RX ORDER — ALBUTEROL SULFATE 90 UG/1
2 AEROSOL, METERED RESPIRATORY (INHALATION) EVERY 6 HOURS PRN
COMMUNITY

## 2023-05-16 RX ORDER — POTASSIUM CITRATE 10 MEQ/1
TABLET, EXTENDED RELEASE ORAL 2 TIMES DAILY
COMMUNITY

## 2023-05-16 RX ORDER — M-VIT,TX,IRON,MINS/CALC/FOLIC 27MG-0.4MG
1 TABLET ORAL DAILY
COMMUNITY

## 2023-05-17 ENCOUNTER — HOSPITAL ENCOUNTER (OUTPATIENT)
Age: 68
Setting detail: OUTPATIENT SURGERY
Discharge: HOME | End: 2023-05-17
Attending: UROLOGY | Admitting: UROLOGY
Payer: MEDICARE

## 2023-05-17 ENCOUNTER — ANESTHESIA (OUTPATIENT)
Dept: OPERATING ROOM | Age: 68
End: 2023-05-17
Payer: MEDICARE

## 2023-05-17 ENCOUNTER — HOSPITAL ENCOUNTER (OUTPATIENT)
Dept: ULTRASOUND IMAGING | Age: 68
Setting detail: OUTPATIENT SURGERY
Discharge: HOME | End: 2023-05-19
Attending: UROLOGY
Payer: MEDICARE

## 2023-05-17 VITALS
SYSTOLIC BLOOD PRESSURE: 148 MMHG | TEMPERATURE: 98.1 F | HEART RATE: 66 BPM | DIASTOLIC BLOOD PRESSURE: 71 MMHG | HEIGHT: 73 IN | OXYGEN SATURATION: 100 % | RESPIRATION RATE: 18 BRPM | BODY MASS INDEX: 30.48 KG/M2 | WEIGHT: 230 LBS

## 2023-05-17 DIAGNOSIS — R97.20 ELEVATED PSA: ICD-10-CM

## 2023-05-17 LAB
EGFR, POC: >60 ML/MIN/1.73M2
GLUCOSE BLD-MCNC: 101 MG/DL (ref 74–100)
POC BUN: NORMAL MG/DL (ref 8–26)
POC CREATININE: 0.51 MG/DL (ref 0.51–1.19)
POTASSIUM BLD-SCNC: 4.2 MMOL/L (ref 3.5–4.5)

## 2023-05-17 PROCEDURE — 3600000012 HC SURGERY LEVEL 2 ADDTL 15MIN: Performed by: UROLOGY

## 2023-05-17 PROCEDURE — 2580000003 HC RX 258: Performed by: ANESTHESIOLOGY

## 2023-05-17 PROCEDURE — 3700000001 HC ADD 15 MINUTES (ANESTHESIA): Performed by: UROLOGY

## 2023-05-17 PROCEDURE — 76942 ECHO GUIDE FOR BIOPSY: CPT

## 2023-05-17 PROCEDURE — 3700000000 HC ANESTHESIA ATTENDED CARE: Performed by: UROLOGY

## 2023-05-17 PROCEDURE — 7100000010 HC PHASE II RECOVERY - FIRST 15 MIN: Performed by: UROLOGY

## 2023-05-17 PROCEDURE — 2500000003 HC RX 250 WO HCPCS: Performed by: UROLOGY

## 2023-05-17 PROCEDURE — 3600000002 HC SURGERY LEVEL 2 BASE: Performed by: UROLOGY

## 2023-05-17 PROCEDURE — 82565 ASSAY OF CREATININE: CPT

## 2023-05-17 PROCEDURE — 84520 ASSAY OF UREA NITROGEN: CPT

## 2023-05-17 PROCEDURE — 2500000003 HC RX 250 WO HCPCS

## 2023-05-17 PROCEDURE — 7100000011 HC PHASE II RECOVERY - ADDTL 15 MIN: Performed by: UROLOGY

## 2023-05-17 PROCEDURE — 88305 TISSUE EXAM BY PATHOLOGIST: CPT

## 2023-05-17 PROCEDURE — 84132 ASSAY OF SERUM POTASSIUM: CPT

## 2023-05-17 PROCEDURE — 82947 ASSAY GLUCOSE BLOOD QUANT: CPT

## 2023-05-17 PROCEDURE — 2709999900 HC NON-CHARGEABLE SUPPLY: Performed by: UROLOGY

## 2023-05-17 PROCEDURE — 6360000002 HC RX W HCPCS

## 2023-05-17 RX ORDER — SODIUM CHLORIDE 0.9 % (FLUSH) 0.9 %
5-40 SYRINGE (ML) INJECTION PRN
Status: CANCELLED | OUTPATIENT
Start: 2023-05-17

## 2023-05-17 RX ORDER — PROPOFOL 10 MG/ML
INJECTION, EMULSION INTRAVENOUS CONTINUOUS PRN
Status: DISCONTINUED | OUTPATIENT
Start: 2023-05-17 | End: 2023-05-17 | Stop reason: SDUPTHER

## 2023-05-17 RX ORDER — SODIUM CHLORIDE 9 MG/ML
INJECTION, SOLUTION INTRAVENOUS PRN
Status: CANCELLED | OUTPATIENT
Start: 2023-05-17

## 2023-05-17 RX ORDER — SODIUM CHLORIDE 0.9 % (FLUSH) 0.9 %
5-40 SYRINGE (ML) INJECTION EVERY 12 HOURS SCHEDULED
Status: CANCELLED | OUTPATIENT
Start: 2023-05-17

## 2023-05-17 RX ORDER — LIDOCAINE HYDROCHLORIDE 10 MG/ML
INJECTION, SOLUTION EPIDURAL; INFILTRATION; INTRACAUDAL; PERINEURAL PRN
Status: DISCONTINUED | OUTPATIENT
Start: 2023-05-17 | End: 2023-05-17 | Stop reason: SDUPTHER

## 2023-05-17 RX ORDER — HYDRALAZINE HYDROCHLORIDE 20 MG/ML
10 INJECTION INTRAMUSCULAR; INTRAVENOUS
Status: CANCELLED | OUTPATIENT
Start: 2023-05-17

## 2023-05-17 RX ORDER — LIDOCAINE HYDROCHLORIDE 10 MG/ML
INJECTION, SOLUTION EPIDURAL; INFILTRATION; INTRACAUDAL; PERINEURAL PRN
Status: DISCONTINUED | OUTPATIENT
Start: 2023-05-17 | End: 2023-05-17 | Stop reason: ALTCHOICE

## 2023-05-17 RX ORDER — LABETALOL HYDROCHLORIDE 5 MG/ML
10 INJECTION, SOLUTION INTRAVENOUS
Status: CANCELLED | OUTPATIENT
Start: 2023-05-17

## 2023-05-17 RX ORDER — SODIUM CHLORIDE, SODIUM LACTATE, POTASSIUM CHLORIDE, CALCIUM CHLORIDE 600; 310; 30; 20 MG/100ML; MG/100ML; MG/100ML; MG/100ML
INJECTION, SOLUTION INTRAVENOUS CONTINUOUS
Status: DISCONTINUED | OUTPATIENT
Start: 2023-05-17 | End: 2023-05-17 | Stop reason: HOSPADM

## 2023-05-17 RX ORDER — PROPOFOL 10 MG/ML
INJECTION, EMULSION INTRAVENOUS PRN
Status: DISCONTINUED | OUTPATIENT
Start: 2023-05-17 | End: 2023-05-17 | Stop reason: SDUPTHER

## 2023-05-17 RX ORDER — ONDANSETRON 2 MG/ML
4 INJECTION INTRAMUSCULAR; INTRAVENOUS
Status: CANCELLED | OUTPATIENT
Start: 2023-05-17 | End: 2023-05-18

## 2023-05-17 RX ADMIN — PROPOFOL 150 MCG/KG/MIN: 10 INJECTION, EMULSION INTRAVENOUS at 12:46

## 2023-05-17 RX ADMIN — PROPOFOL 20 MG: 10 INJECTION, EMULSION INTRAVENOUS at 12:49

## 2023-05-17 RX ADMIN — SODIUM CHLORIDE, POTASSIUM CHLORIDE, SODIUM LACTATE AND CALCIUM CHLORIDE: 600; 310; 30; 20 INJECTION, SOLUTION INTRAVENOUS at 12:39

## 2023-05-17 RX ADMIN — LIDOCAINE HYDROCHLORIDE 50 MG: 10 INJECTION, SOLUTION EPIDURAL; INFILTRATION; INTRACAUDAL; PERINEURAL at 12:46

## 2023-05-17 RX ADMIN — PROPOFOL 20 MG: 10 INJECTION, EMULSION INTRAVENOUS at 12:46

## 2023-05-17 ASSESSMENT — PAIN - FUNCTIONAL ASSESSMENT: PAIN_FUNCTIONAL_ASSESSMENT: NONE - DENIES PAIN

## 2023-05-17 ASSESSMENT — PAIN SCALES - WONG BAKER: WONGBAKER_NUMERICALRESPONSE: 0

## 2023-05-17 NOTE — OP NOTE
Operative Note      Patient: Ivory Keita  YOB: 1955  MRN: [id number]    Date of Procedure: 5/17/2023    Pre-Op Diagnosis Codes:     * Elevated PSA [R97.20]    Post-Op Diagnosis: Same    Procedure performed:  1. Trans rectal ultrasound of prostate  2. Saturation prostate biopsy    Surgeon(s):  Krysta Fry MD    Assistant:   Resident: Miguelito Lara DO, PGY 3    Anesthesia: Monitor Anesthesia Care    Estimated Blood Loss (mL): Minimal    Complications: None    Specimens:   ID Type Source Tests Collected by Time Destination   A : PROSTATE BIOPSIES X12 (SATURATION) Tissue Prostate SURGICAL PATHOLOGY Krysta Fry MD 5/17/2023 1229        Implants:  None      Drains: None    Findings: Prostate 90 cc    Indications: This is a 68-year-old male with elevated PSA who presents today for transrectal ultrasound with prostate biopsy. Risk, benefits, alternatives to the procedure were discussed with the patient in detail and elected proceed. Informed consent was obtained. Detailed Description of Procedure:   Patient was brought back to the operating room he remained in his hospital bed. Anesthesia was induced and patient had already taken preoperative antibiotics, p.o. Cipro 500 mg this morning. He was turned to left lateral decubitus position and ultrasound was introduced into the rectum. The prostate was measured and found to be 90 cc. We then injected 1% lidocaine for local anesthesia. We then proceeded to take 3 cores from each of the sextant locations in the prostate, from the base, mid, apex on the left and right sides of the gland. Total number of specimens was 36. Probe was then removed from the rectum. There was no bleeding seen from the rectum. This concluded the procedure. He was awoken in the operating room and transferred PACU in stable condition. Dr. Melani Estrada was available for the entire duration of the procedure.     Plan: Patient will either receive a call, or follow-up to

## 2023-05-17 NOTE — ANESTHESIA PRE PROCEDURE
5pm and 9pm. Dispense for 90 days.  potassium citrate (UROCIT-K) 10 MEQ (1080 MG) extended release tablet Take by mouth 2 times daily      albuterol sulfate HFA (VENTOLIN HFA) 108 (90 Base) MCG/ACT inhaler Inhale 2 puffs into the lungs every 6 hours as needed for Wheezing      Multiple Vitamins-Minerals (THERAPEUTIC MULTIVITAMIN-MINERALS) tablet Take 1 tablet by mouth daily      ciprofloxacin (CIPRO) 500 MG tablet Take 1 tablet by mouth 2 times daily      ferrous sulfate (IRON 325) 325 (65 Fe) MG tablet Take 1 tablet by mouth daily (with breakfast)      amLODIPine (NORVASC) 5 MG tablet Take 5 mg by mouth daily      aspirin 81 MG tablet Take 81 mg by mouth daily      atorvastatin (LIPITOR) 10 MG tablet Take 10 mg by mouth daily      acetaminophen (TYLENOL) 325 MG tablet Take 650 mg by mouth every 6 hours as needed for Pain      atenolol (TENORMIN) 25 MG tablet Take 25 mg by mouth daily         Allergies:  No Known Allergies    Problem List:  There is no problem list on file for this patient. Past Medical History:        Diagnosis Date    ED (erectile dysfunction)     Elevated PSA     Hyperlipidemia     Hypertension     Insomnia     Kidney stones     [facility]'s disease ([facility] [address]     Under care of team     [address] [facility], last seen 3/31/2023    Under care of team     Gilson Hernandes, last seen 5/4/2023 for left hip pain    Under care of team     Urology, Dr. Tejal Yang , last seen 3/30/2023    Wellness examination     PCP, Dr. Afsaneh Sheldon, last seen ?        Past Surgical History:        Procedure Laterality Date    COLONOSCOPY  04/20/2012    CYSTO/URETERO/PYELOSCOPY, CALCULUS TX Left 10/11/2019    FORTEC HOLMIUM LASER, CYSTSCOPY, URETEROSCOPY, STENT PLACEMENT Bee Nowak CONF# [id number] - [place]) performed by Emili Brar MD at [address] [place] [address] Right 12/10/2020    CYSTOSCOPY,  URETERAL STENT INSERTION performed by Emili Brar MD at [address] [place] [address]

## 2023-05-17 NOTE — DISCHARGE INSTRUCTIONS
You may experience blood in stool, urine, and semen. This should resolve over the next couple days. Tylenol for pain control  Pt ok to discharge home in good condition  No heavy lifting, >10 lbs for today  Pt should avoid strenuous activity for today  Pt should walk moderately at home  Pt ok to shower   Pt may resume diet as tolerated  Pt should take Rx as directed: cipro that was previously perscribed. No driving while on narcotics  Please call attending physician or hospital  with questions  Call or Present to ED if fever (> 101F), intractable nausea vomiting or pain. Pt should follow up with Dr. Janelle Her, in 1 week for pathology results.   call to confirm appointment     [address] [facility] 72 HRS, DO NOT RESUME UNTIL YOU NO LONGER SEE BLOOD IN THE URINE

## 2023-05-17 NOTE — H&P
Pre-op History and Physical  Francisco Stapleton PA-C    Patient:  Lauren Walters  MRN: [id number]  YOB: 1955    HISTORY OF PRESENT ILLNESS:     The patient is a 75 y.o. male who presents with elevated PSA. Here for Saturation prostate biopsy with US. Patient's old records, notes and chart reviewed and summarized above. Francisco Stapleton PA-C independently reviewed the images and verified the radiology reports from:    [facility]    Result Date: 12/10/2020  Radiology exam is complete. No Radiologist dictation. Please follow up with ordering provider. Past Medical History:    Past Medical History:   Diagnosis Date    ED (erectile dysfunction)     Elevated PSA     Hyperlipidemia     Hypertension     Insomnia     Kidney stones     Parkinson's disease ([facility] [address]     Under care of team     [address] [facility], last seen 3/31/2023    Under care of team     Carlos Enrique Ledesma, last seen 5/4/2023 for left hip pain    Under care of team     Urology, Dr. Jamaal Nicholas , last seen 3/30/2023    Wellness examination     PCP, Dr. Clover Hernandez, last seen ? Past Surgical History:    Past Surgical History:   Procedure Laterality Date    COLONOSCOPY  04/20/2012    CYSTO/URETERO/PYELOSCOPY, CALCULUS TX Left 10/11/2019    FORTEC HOLMIUM LASER, CYSTSCOPY, URETEROSCOPY, STENT PLACEMENT Suzanne Lynch CONF# [id number] - [place]) performed by Sol Perry MD at [address] Right 12/10/2020    CYSTOSCOPY,  URETERAL STENT INSERTION performed by Sol Perry MD at [address] [place] [address]  12/19/2017    SKIN BIOPSY      right ear biopsy       Medications Prior to Admission:    Prior to Admission medications    Medication Sig Start Date End Date Taking? Authorizing Provider   carbidopa-levodopa (SINEMET)  MG per tablet Take 1 tab at 9am, 1pm, 5pm and 9pm. Dispense for 90 days.  3/31/23  Yes Historical Provider, MD   potassium citrate (UROCIT-K) 10 MEQ (1080 MG)

## 2023-05-17 NOTE — ANESTHESIA POSTPROCEDURE EVALUATION
Department of Anesthesiology  Postprocedure Note    Patient: Cherie Bingham  MRN: [id number]  YOB: 1955  Date of evaluation: 5/17/2023      Procedure Summary     Date: 05/17/23 Room / Location: [address]    Anesthesia Start: 1243 Anesthesia Stop: 1310    Procedure: SATURATION PROSTATE BIOPSY ULTRASOUND Diagnosis:       Elevated PSA      (ELEVATED PSA)    Surgeons: Satish Boyd MD Responsible Provider: Liu Boston MD    Anesthesia Type: MAC ASA Status: 2          Anesthesia Type: No value filed.     Ventura Phase I:      Ventura Phase II: Ventura Score: 10      Anesthesia Post Evaluation    Patient location during evaluation: PACU  Patient participation: complete - patient participated  Level of consciousness: awake  Airway patency: patent  Nausea & Vomiting: no nausea and no vomiting  Complications: no  Cardiovascular status: blood pressure returned to baseline  Respiratory status: acceptable  Hydration status: euvolemic  Comments: BP (!) 148/71   Pulse 66   Temp 98.1 °F (36.7 °C)   Resp 18   Ht 6' 1\" (1.854 m)   Wt 230 lb (104.3 kg)   SpO2 100%   BMI 30.34 kg/m²   Pain Assessment: None - Denies Pain

## 2023-05-19 LAB — SURGICAL PATHOLOGY REPORT: NORMAL

## 2023-12-12 LAB — PROSTATE SPECIFIC ANTIGEN: 5.57 NG/ML

## 2024-06-11 LAB — PROSTATE SPECIFIC ANTIGEN: 3.76 NG/ML

## 2024-08-15 NOTE — H&P
John Oates, My, Manuel, Miki, [place], Nathaly Jr  Urology History & Physical      Patient:  Lucio Quiroga  MRN: [id number]  YOB: 1955    CHIEF COMPLAINT: Elevated PSA    HISTORY OF PRESENT ILLNESS:   The patient is a 69 y.o. male with a history of elevated PSA who presents today for saturation prostate biopsy    Patient's old records, notes and chart reviewed and summarized above.    Past Medical History:    Past Medical History:   Diagnosis Date    Arthritis     L knee    ED (erectile dysfunction)     Elevated PSA     Hyperlipidemia     Hypertension     Kidney stones     Parkinson's disease (HCC)     Prostate cancer (HCC)     Under care of team     [facility], last seen 7/26/2024    Under care of team     [facility], last seen 12/28/2023    Under care of team     Urology, Dr. Lopez , last seen 6/25/2024    Wellness examination     PCP, Dr. Thorne, last seen 4/3/2023       Past Surgical History:    Past Surgical History:   Procedure Laterality Date    COLONOSCOPY  04/20/2012    CYSTO/URETERO/PYELOSCOPY, CALCULUS TX Left 10/11/2019    FORTEC HOLMIUM LASER, CYSTSCOPY, URETEROSCOPY, STENT PLACEMENT (FORTEC CONF# [id number] - LAURA) performed by Jayden Lopez MD at [facility] OR    CYSTOSCOPY Right 12/10/2020    CYSTOSCOPY,  URETERAL STENT INSERTION performed by Jayden Lopez MD at [facility] OR    HEMORRHOID SURGERY      PROSTATE BIOPSY  12/19/2017    PROSTATE SURGERY N/A 05/17/2023    SATURATION PROSTATE BIOPSY ULTRASOUND performed by Jayden Lopez MD at [facility] OR    SKIN BIOPSY      right ear biopsy       Medications:    No current facility-administered medications for this encounter.    Current Outpatient Medications:     carbidopa-levodopa (SINEMET)  MG per tablet, Take 1 tab at 9am, 1pm, 5pm and 9pm. Dispense for 90 days., Disp: , Rfl:     potassium citrate (UROCIT-K) 10 MEQ (1080 MG) extended release tablet, Take by mouth 2 times daily, Disp: , Rfl:     Multiple  Transportation (Non-Medical): No   Physical Activity: Insufficiently Active (11/21/2020)    Received from [employer] [employer]    Exercise Vital Sign     Days of Exercise per Week: 3 days     Minutes of Exercise per Session: 30 min   Stress: No Stress Concern Present (11/21/2020)    Received from [employer] [employer]    [employer] [place] [employer] - Occupational Stress Questionnaire     Feeling of Stress : Not at all   Social Connections: Moderately Isolated (11/21/2020)    Received from [employer] [employer]    Social Connection and Isolation Panel [NHANES]     Frequency of Communication with Friends and Family: More than three times a week     Frequency of Social Gatherings with Friends and Family: More than three times a week     Attends Taoist Services: Never     Active Member of Clubs or Organizations: No     Attends Club or Organization Meetings: Never     Marital Status:    Intimate Partner Violence: Not on file   Housing Stability: Not on file       Family History:    Family History   Problem Relation Age of Onset    Kidney Disease Mother     Heart Disease Mother     Cancer Father     Heart Disease Father     High Blood Pressure Father     Diabetes Father     Kidney stones Father        REVIEW OF SYSTEMS:  A comprehensive 14 point review of systems was obtained.  Review of Systems        Physical Exam:    This a 69 y.o. male   There were no vitals filed for this visit.  Physical Exam    Labs:  No results for input(s): \"WBC\", \"HGB\", \"HCT\", \"MCV\", \"PLT\" in the last 72 hours.  No results for input(s): \"NA\", \"K\", \"CL\", \"CO2\", \"PHOS\", \"BUN\", \"CREATININE\" in the last 72 hours.    Invalid input(s): \"CA\"    No results for input(s): \"COLORU\", \"PHUR\", \"LABCAST\", \"WBCUA\", \"RBCUA\", \"MUCUS\", \"TRICHOMONAS\", \"YEAST\", \"BACTERIA\", \"CLARITYU\", \"SPECGRAV\", \"LEUKOCYTESUR\", \"UROBILINOGEN\", \"BILIRUBINUR\", \"BLOODU\" in the last

## 2024-08-16 ENCOUNTER — HOSPITAL ENCOUNTER (OUTPATIENT)
Age: 69
Setting detail: OUTPATIENT SURGERY
Discharge: HOME | End: 2024-08-16
Attending: UROLOGY | Admitting: UROLOGY
Payer: MEDICARE

## 2024-08-16 ENCOUNTER — ANESTHESIA EVENT (OUTPATIENT)
Dept: OPERATING ROOM | Age: 69
End: 2024-08-16
Payer: MEDICARE

## 2024-08-16 ENCOUNTER — ANESTHESIA (OUTPATIENT)
Dept: OPERATING ROOM | Age: 69
End: 2024-08-16
Payer: MEDICARE

## 2024-08-16 ENCOUNTER — HOSPITAL ENCOUNTER (OUTPATIENT)
Dept: ULTRASOUND IMAGING | Age: 69
Setting detail: OUTPATIENT SURGERY
End: 2024-08-16
Attending: UROLOGY
Payer: MEDICARE

## 2024-08-16 VITALS
SYSTOLIC BLOOD PRESSURE: 163 MMHG | DIASTOLIC BLOOD PRESSURE: 88 MMHG | HEIGHT: 73 IN | BODY MASS INDEX: 29.16 KG/M2 | TEMPERATURE: 96.8 F | HEART RATE: 64 BPM | RESPIRATION RATE: 14 BRPM | WEIGHT: 220 LBS | OXYGEN SATURATION: 96 %

## 2024-08-16 DIAGNOSIS — C61 PROSTATE CANCER (HCC): ICD-10-CM

## 2024-08-16 LAB
BUN BLD-MCNC: 21 MG/DL (ref 8–26)
EGFR, POC: >90 ML/MIN/1.73M2
GLUCOSE BLD-MCNC: 93 MG/DL (ref 74–100)
POC CREATININE: 0.9 MG/DL (ref 0.51–1.19)

## 2024-08-16 PROCEDURE — 6370000000 HC RX 637 (ALT 250 FOR IP): Performed by: STUDENT IN AN ORGANIZED HEALTH CARE EDUCATION/TRAINING PROGRAM

## 2024-08-16 PROCEDURE — 2709999900 HC NON-CHARGEABLE SUPPLY: Performed by: UROLOGY

## 2024-08-16 PROCEDURE — 82947 ASSAY GLUCOSE BLOOD QUANT: CPT

## 2024-08-16 PROCEDURE — 76942 ECHO GUIDE FOR BIOPSY: CPT

## 2024-08-16 PROCEDURE — 7100000011 HC PHASE II RECOVERY - ADDTL 15 MIN: Performed by: UROLOGY

## 2024-08-16 PROCEDURE — 3700000001 HC ADD 15 MINUTES (ANESTHESIA): Performed by: UROLOGY

## 2024-08-16 PROCEDURE — 3600000012 HC SURGERY LEVEL 2 ADDTL 15MIN: Performed by: UROLOGY

## 2024-08-16 PROCEDURE — 82565 ASSAY OF CREATININE: CPT

## 2024-08-16 PROCEDURE — 3600000002 HC SURGERY LEVEL 2 BASE: Performed by: UROLOGY

## 2024-08-16 PROCEDURE — 3700000000 HC ANESTHESIA ATTENDED CARE: Performed by: UROLOGY

## 2024-08-16 PROCEDURE — 7100000010 HC PHASE II RECOVERY - FIRST 15 MIN: Performed by: UROLOGY

## 2024-08-16 PROCEDURE — 6360000002 HC RX W HCPCS

## 2024-08-16 PROCEDURE — 2500000003 HC RX 250 WO HCPCS

## 2024-08-16 PROCEDURE — 88305 TISSUE EXAM BY PATHOLOGIST: CPT

## 2024-08-16 PROCEDURE — 88344 IMHCHEM/IMCYTCHM EA MLT ANTB: CPT

## 2024-08-16 PROCEDURE — 2580000003 HC RX 258: Performed by: ANESTHESIOLOGY

## 2024-08-16 PROCEDURE — 2500000003 HC RX 250 WO HCPCS: Performed by: UROLOGY

## 2024-08-16 PROCEDURE — 84520 ASSAY OF UREA NITROGEN: CPT

## 2024-08-16 RX ORDER — NALOXONE HYDROCHLORIDE 0.4 MG/ML
INJECTION, SOLUTION INTRAMUSCULAR; INTRAVENOUS; SUBCUTANEOUS PRN
Status: DISCONTINUED | OUTPATIENT
Start: 2024-08-16 | End: 2024-08-16 | Stop reason: HOSPADM

## 2024-08-16 RX ORDER — SODIUM CHLORIDE 9 MG/ML
INJECTION, SOLUTION INTRAVENOUS PRN
Status: DISCONTINUED | OUTPATIENT
Start: 2024-08-16 | End: 2024-08-16 | Stop reason: HOSPADM

## 2024-08-16 RX ORDER — FENTANYL CITRATE 50 UG/ML
50 INJECTION, SOLUTION INTRAMUSCULAR; INTRAVENOUS EVERY 5 MIN PRN
Status: DISCONTINUED | OUTPATIENT
Start: 2024-08-16 | End: 2024-08-16 | Stop reason: HOSPADM

## 2024-08-16 RX ORDER — PROPOFOL 10 MG/ML
INJECTION, EMULSION INTRAVENOUS PRN
Status: DISCONTINUED | OUTPATIENT
Start: 2024-08-16 | End: 2024-08-16 | Stop reason: SDUPTHER

## 2024-08-16 RX ORDER — SODIUM CHLORIDE 0.9 % (FLUSH) 0.9 %
5-40 SYRINGE (ML) INJECTION EVERY 12 HOURS SCHEDULED
Status: DISCONTINUED | OUTPATIENT
Start: 2024-08-16 | End: 2024-08-16 | Stop reason: HOSPADM

## 2024-08-16 RX ORDER — SODIUM CHLORIDE, SODIUM LACTATE, POTASSIUM CHLORIDE, CALCIUM CHLORIDE 600; 310; 30; 20 MG/100ML; MG/100ML; MG/100ML; MG/100ML
INJECTION, SOLUTION INTRAVENOUS CONTINUOUS
Status: DISCONTINUED | OUTPATIENT
Start: 2024-08-16 | End: 2024-08-16 | Stop reason: HOSPADM

## 2024-08-16 RX ORDER — LIDOCAINE HYDROCHLORIDE 10 MG/ML
INJECTION, SOLUTION INFILTRATION; PERINEURAL PRN
Status: DISCONTINUED | OUTPATIENT
Start: 2024-08-16 | End: 2024-08-16 | Stop reason: HOSPADM

## 2024-08-16 RX ORDER — PHENAZOPYRIDINE HYDROCHLORIDE 100 MG/1
200 TABLET, FILM COATED ORAL
Status: COMPLETED | OUTPATIENT
Start: 2024-08-16 | End: 2024-08-16

## 2024-08-16 RX ORDER — LIDOCAINE HYDROCHLORIDE 10 MG/ML
INJECTION, SOLUTION EPIDURAL; INFILTRATION; INTRACAUDAL; PERINEURAL PRN
Status: DISCONTINUED | OUTPATIENT
Start: 2024-08-16 | End: 2024-08-16 | Stop reason: SDUPTHER

## 2024-08-16 RX ORDER — SODIUM CHLORIDE 0.9 % (FLUSH) 0.9 %
5-40 SYRINGE (ML) INJECTION PRN
Status: DISCONTINUED | OUTPATIENT
Start: 2024-08-16 | End: 2024-08-16 | Stop reason: HOSPADM

## 2024-08-16 RX ORDER — FENTANYL CITRATE 50 UG/ML
25 INJECTION, SOLUTION INTRAMUSCULAR; INTRAVENOUS EVERY 5 MIN PRN
Status: DISCONTINUED | OUTPATIENT
Start: 2024-08-16 | End: 2024-08-16 | Stop reason: HOSPADM

## 2024-08-16 RX ADMIN — LIDOCAINE HYDROCHLORIDE 50 MG: 10 INJECTION, SOLUTION EPIDURAL; INFILTRATION; INTRACAUDAL; PERINEURAL at 12:01

## 2024-08-16 RX ADMIN — SODIUM CHLORIDE, POTASSIUM CHLORIDE, SODIUM LACTATE AND CALCIUM CHLORIDE: 600; 310; 30; 20 INJECTION, SOLUTION INTRAVENOUS at 11:06

## 2024-08-16 RX ADMIN — PHENAZOPYRIDINE HYDROCHLORIDE 200 MG: 100 TABLET ORAL at 12:53

## 2024-08-16 RX ADMIN — PROPOFOL 80 MG: 10 INJECTION, EMULSION INTRAVENOUS at 12:01

## 2024-08-16 RX ADMIN — PROPOFOL 125 MCG/KG/MIN: 10 INJECTION, EMULSION INTRAVENOUS at 12:02

## 2024-08-16 ASSESSMENT — PAIN - FUNCTIONAL ASSESSMENT: PAIN_FUNCTIONAL_ASSESSMENT: NONE - DENIES PAIN

## 2024-08-16 NOTE — OP NOTE
FACILITY:  [place], [place]    DATE:  8/16/24    SURGEON:  Jayden Lopez M.D.    ASSISTANT:  Irma Murdock MD PGY5       PREOPERATIVE DIAGNOSIS:  Elevated prostatic specific antigen.     POSTOPERATIVE DIAGNOSIS:  Elevated prostatic specific antigen.    PROCEDURE:  Transrectal ultrasound of the prostate with saturation prostate biopsy.     ANESTHESIA:  MAC and local    IVF:  Crystalloid per anesthesia    SPECIMEN:  Prostate biopsy specimens     COMPLICATIONS:  None.     INDICATIONS:  69 y.o. male with a history of elevated PSA who presents today for saturation prostate biopsy  The risks, benefits and alternatives were explained and  informed consent was signed.     OPERATIVE NARRATIVE:  The patient was brought to the operating room. He was properly identified.  The procedure was confirmed verbally. The patient was administered a monitored anesthetic. He was placed in the lateral decubitus position. The ultrasound probe was placed into the rectum and local anesthesic was administered between both seminal vesicles and the base of the prostate. The prostate was surveyed in its entirety. 10cc lidocaine injected at base of both seminal vesicles. The prostate was 86.8 gm. We then did a systematic 36 core prostate biopsy with 3 biopsies in each of the zones of the base, mid gland, and apex laterally and medially on each side for a total of 36 biopsies. The procedure was then  terminated. The patient tolerated the procedure well. The plan is for the patient to be discharged home. He has scripts for antibiotics. He will follow up with as an outpatient to go over his path report. Dr Lopez present throughout.

## 2024-08-16 NOTE — ANESTHESIA PRE PROCEDURE
Department of Anesthesiology  Preprocedure Note       Name:  Lucio Quiroga   Age:  69 y.o.  :  1955                                          MRN:  [id number]         Date:  2024      Surgeon: Surgeon(s):  Jayden Lopez MD    Procedure: SATURATION PROSTATE BIOPSY ULTRASOUND    Medications prior to admission:   Prior to Admission medications    Medication Sig Start Date End Date Taking? Authorizing Provider   carbidopa-levodopa (SINEMET)  MG per tablet Take 1 tab at 9am, 1pm, 5pm and 9pm. Dispense for 90 days. 3/31/23  Yes ProviderMoisés MD   potassium citrate (UROCIT-K) 10 MEQ (1080 MG) extended release tablet Take by mouth 2 times daily   Yes ProviderMoisés MD   Multiple Vitamins-Minerals (THERAPEUTIC MULTIVITAMIN-MINERALS) tablet Take 1 tablet by mouth daily   Yes ProviderMoisés MD   amLODIPine (NORVASC) 5 MG tablet Take 1 tablet by mouth daily   Yes Moisés Phillip MD   aspirin 81 MG tablet Take 1 tablet by mouth daily   Yes ProviderMoisés MD   atorvastatin (LIPITOR) 10 MG tablet Take 1 tablet by mouth daily   Yes ProviderMoisés MD   acetaminophen (TYLENOL) 325 MG tablet Take 2 tablets by mouth every 6 hours as needed for Pain   Yes Provider, MD Moisés   atenolol (TENORMIN) 25 MG tablet Take 1 tablet by mouth daily   Yes ProviderMoisés MD       Current medications:    Current Facility-Administered Medications   Medication Dose Route Frequency Provider Last Rate Last Admin   • lactated ringers IV soln infusion   IntraVENous Continuous Klaudia Thomas MD           Allergies:  No Known Allergies    Problem List:  There is no problem list on file for this patient.      Past Medical History:        Diagnosis Date   • Arthritis     L knee   • ED (erectile dysfunction)    • Elevated PSA    • Hyperlipidemia    • Hypertension    • Kidney stones    • Parkinson's disease (HCC)    • Prostate cancer (HCC)    • Under care of team     [facility]  \"APTT\"    HCG (If Applicable): No results found for: \"PREGTESTUR\", \"PREGSERUM\", \"HCG\", \"HCGQUANT\"     ABGs: No results found for: \"PHART\", \"PO2ART\", \"USJ5QNP\", \"GTT6UNH\", \"BEART\", \"K7SNQLNW\"     Type & Screen (If Applicable):  No results found for: \"LABABO\"    Anesthesia Evaluation  Patient summary reviewed   no history of anesthetic complications:   Airway: Mallampati: III  TM distance: >3 FB   Neck ROM: full  Mouth opening: > = 3 FB   Dental: normal exam         Pulmonary:Negative Pulmonary ROS and normal exam                               Cardiovascular:  Exercise tolerance: no interval change  (+) hypertension: no interval change, hyperlipidemia      ECG reviewed               Beta Blocker:  Dose within 24 Hrs         Neuro/Psych:   (+) neuromuscular disease: Parkinson's disease            GI/Hepatic/Renal: Neg GI/Hepatic/Renal ROS  (+) renal disease: kidney stones          Endo/Other: Negative Endo/Other ROS             Pt had PAT visit.       Abdominal: normal exam            Vascular: negative vascular ROS.         Other Findings:           Anesthesia Plan      MAC     ASA 2       Induction: intravenous.    MIPS: Postoperative opioids intended.  Anesthetic plan and risks discussed with patient.    Use of blood products discussed with patient whom consented to blood products.    Plan discussed with CRNA.                  Klaudia Thomas MD   8/16/2024

## 2024-08-16 NOTE — DISCHARGE INSTRUCTIONS
You may experience blood in stool, urine, and semen.  This should resolve over the next couple days.  Tylenol for pain control  Pt ok to discharge home in good condition  No heavy lifting, >10 lbs for today  Pt should avoid strenuous activity for today  Pt should walk moderately at home  Pt ok to shower   Pt may resume diet as tolerated  Pt should take Rx as directed: cipro that was previously perscribed.  No driving while on narcotics  Please call attending physician or hospital  with questions  Call or Present to ED if fever (> 101F), intractable nausea vomiting or pain.    Pt should follow up with Dr. Lopez, in 1 week for pathology results.  call to confirm appointment

## 2024-08-19 NOTE — ANESTHESIA POSTPROCEDURE EVALUATION
Department of Anesthesiology  Postprocedure Note    Patient: Lucio Quiroga  MRN: [id number]  YOB: 1955  Date of evaluation: 8/19/2024    Procedure Summary       Date: 08/16/24 Room / Location: [address]    Anesthesia Start: 1153 Anesthesia Stop: 1221    Procedure: SATURATION PROSTATE BIOPSY ULTRASOUND Diagnosis:       Prostate cancer (HCC)      (Prostate cancer (HCC) [C61])    Surgeons: Jayden Lopez MD Responsible Provider: Klaudia Thomas MD    Anesthesia Type: MAC ASA Status: 2            Anesthesia Type: No value filed.    Ventura Phase I:      Ventura Phase II: Ventura Score: 10    Anesthesia Post Evaluation    Patient location during evaluation: bedside  Patient participation: complete - patient participated  Level of consciousness: awake and awake and alert  Airway patency: patent  Nausea & Vomiting: no nausea and no vomiting  Cardiovascular status: blood pressure returned to baseline and hemodynamically stable  Respiratory status: acceptable  Hydration status: euvolemic  Pain management: adequate    No notable events documented.

## 2024-08-21 LAB — SURGICAL PATHOLOGY REPORT: NORMAL

## 2024-09-30 ENCOUNTER — ANESTHESIA EVENT (OUTPATIENT)
Dept: OPERATING ROOM | Age: 69
End: 2024-09-30
Payer: MEDICARE

## 2024-10-04 ENCOUNTER — ANESTHESIA (OUTPATIENT)
Dept: OPERATING ROOM | Age: 69
End: 2024-10-04
Payer: MEDICARE

## 2024-10-04 ENCOUNTER — HOSPITAL ENCOUNTER (OUTPATIENT)
Age: 69
Setting detail: OUTPATIENT SURGERY
Discharge: HOME | End: 2024-10-04
Attending: UROLOGY | Admitting: UROLOGY
Payer: MEDICARE

## 2024-10-04 VITALS
RESPIRATION RATE: 10 BRPM | SYSTOLIC BLOOD PRESSURE: 148 MMHG | BODY MASS INDEX: 29.03 KG/M2 | HEART RATE: 58 BPM | WEIGHT: 219 LBS | OXYGEN SATURATION: 96 % | HEIGHT: 73 IN | DIASTOLIC BLOOD PRESSURE: 78 MMHG | TEMPERATURE: 97.8 F

## 2024-10-04 PROCEDURE — 6360000002 HC RX W HCPCS: Performed by: NURSE ANESTHETIST, CERTIFIED REGISTERED

## 2024-10-04 PROCEDURE — 2580000003 HC RX 258: Performed by: NURSE ANESTHETIST, CERTIFIED REGISTERED

## 2024-10-04 PROCEDURE — 7100000011 HC PHASE II RECOVERY - ADDTL 15 MIN: Performed by: UROLOGY

## 2024-10-04 PROCEDURE — 3600000002 HC SURGERY LEVEL 2 BASE: Performed by: UROLOGY

## 2024-10-04 PROCEDURE — 3700000000 HC ANESTHESIA ATTENDED CARE: Performed by: UROLOGY

## 2024-10-04 PROCEDURE — 3600000012 HC SURGERY LEVEL 2 ADDTL 15MIN: Performed by: UROLOGY

## 2024-10-04 PROCEDURE — C1889 IMPLANT/INSERT DEVICE, NOC: HCPCS | Performed by: UROLOGY

## 2024-10-04 PROCEDURE — 7100000010 HC PHASE II RECOVERY - FIRST 15 MIN: Performed by: UROLOGY

## 2024-10-04 PROCEDURE — 2500000003 HC RX 250 WO HCPCS: Performed by: NURSE ANESTHETIST, CERTIFIED REGISTERED

## 2024-10-04 PROCEDURE — 3700000001 HC ADD 15 MINUTES (ANESTHESIA): Performed by: UROLOGY

## 2024-10-04 PROCEDURE — 2500000003 HC RX 250 WO HCPCS: Performed by: UROLOGY

## 2024-10-04 PROCEDURE — 2709999900 HC NON-CHARGEABLE SUPPLY: Performed by: UROLOGY

## 2024-10-04 DEVICE — SPACEOAR SYSTEMS
Type: IMPLANTABLE DEVICE | Status: FUNCTIONAL
Brand: SPACEOAR VUE™ SYSTEM - 10ML

## 2024-10-04 RX ORDER — MORPHINE SULFATE 2 MG/ML
2 INJECTION, SOLUTION INTRAMUSCULAR; INTRAVENOUS EVERY 5 MIN PRN
Status: DISCONTINUED | OUTPATIENT
Start: 2024-10-04 | End: 2024-10-04 | Stop reason: HOSPADM

## 2024-10-04 RX ORDER — SODIUM CHLORIDE 9 MG/ML
INJECTION, SOLUTION INTRAVENOUS PRN
Status: DISCONTINUED | OUTPATIENT
Start: 2024-10-04 | End: 2024-10-04 | Stop reason: HOSPADM

## 2024-10-04 RX ORDER — GLYCOPYRROLATE 0.2 MG/ML
0.4 INJECTION INTRAMUSCULAR; INTRAVENOUS ONCE
Status: DISCONTINUED | OUTPATIENT
Start: 2024-10-04 | End: 2024-10-04 | Stop reason: HOSPADM

## 2024-10-04 RX ORDER — LIDOCAINE HYDROCHLORIDE 10 MG/ML
INJECTION, SOLUTION EPIDURAL; INFILTRATION; INTRACAUDAL; PERINEURAL PRN
Status: DISCONTINUED | OUTPATIENT
Start: 2024-10-04 | End: 2024-10-04 | Stop reason: ALTCHOICE

## 2024-10-04 RX ORDER — OXYCODONE AND ACETAMINOPHEN 5; 325 MG/1; MG/1
2 TABLET ORAL
Status: DISCONTINUED | OUTPATIENT
Start: 2024-10-04 | End: 2024-10-04 | Stop reason: HOSPADM

## 2024-10-04 RX ORDER — SODIUM CHLORIDE 0.9 % (FLUSH) 0.9 %
5-40 SYRINGE (ML) INJECTION EVERY 12 HOURS SCHEDULED
Status: DISCONTINUED | OUTPATIENT
Start: 2024-10-04 | End: 2024-10-04 | Stop reason: HOSPADM

## 2024-10-04 RX ORDER — HYDRALAZINE HYDROCHLORIDE 20 MG/ML
10 INJECTION INTRAMUSCULAR; INTRAVENOUS
Status: DISCONTINUED | OUTPATIENT
Start: 2024-10-04 | End: 2024-10-04 | Stop reason: HOSPADM

## 2024-10-04 RX ORDER — DIPHENHYDRAMINE HYDROCHLORIDE 50 MG/ML
12.5 INJECTION INTRAMUSCULAR; INTRAVENOUS
Status: DISCONTINUED | OUTPATIENT
Start: 2024-10-04 | End: 2024-10-04 | Stop reason: HOSPADM

## 2024-10-04 RX ORDER — LABETALOL HYDROCHLORIDE 5 MG/ML
10 INJECTION, SOLUTION INTRAVENOUS
Status: DISCONTINUED | OUTPATIENT
Start: 2024-10-04 | End: 2024-10-04 | Stop reason: HOSPADM

## 2024-10-04 RX ORDER — LIDOCAINE HYDROCHLORIDE 10 MG/ML
INJECTION, SOLUTION EPIDURAL; INFILTRATION; INTRACAUDAL; PERINEURAL
Status: DISCONTINUED | OUTPATIENT
Start: 2024-10-04 | End: 2024-10-04 | Stop reason: SDUPTHER

## 2024-10-04 RX ORDER — LIDOCAINE HYDROCHLORIDE 10 MG/ML
1 INJECTION, SOLUTION EPIDURAL; INFILTRATION; INTRACAUDAL; PERINEURAL
Status: DISCONTINUED | OUTPATIENT
Start: 2024-10-04 | End: 2024-10-04 | Stop reason: HOSPADM

## 2024-10-04 RX ORDER — DOCUSATE SODIUM 100 MG/1
100 CAPSULE, LIQUID FILLED ORAL 2 TIMES DAILY
Qty: 14 CAPSULE | Refills: 1 | Status: SHIPPED | OUTPATIENT
Start: 2024-10-04 | End: 2024-10-18

## 2024-10-04 RX ORDER — SODIUM CHLORIDE, SODIUM LACTATE, POTASSIUM CHLORIDE, CALCIUM CHLORIDE 600; 310; 30; 20 MG/100ML; MG/100ML; MG/100ML; MG/100ML
INJECTION, SOLUTION INTRAVENOUS CONTINUOUS
Status: DISCONTINUED | OUTPATIENT
Start: 2024-10-04 | End: 2024-10-04 | Stop reason: HOSPADM

## 2024-10-04 RX ORDER — NALOXONE HYDROCHLORIDE 0.4 MG/ML
INJECTION, SOLUTION INTRAMUSCULAR; INTRAVENOUS; SUBCUTANEOUS PRN
Status: DISCONTINUED | OUTPATIENT
Start: 2024-10-04 | End: 2024-10-04 | Stop reason: HOSPADM

## 2024-10-04 RX ORDER — PROPOFOL 10 MG/ML
INJECTION, EMULSION INTRAVENOUS
Status: DISCONTINUED | OUTPATIENT
Start: 2024-10-04 | End: 2024-10-04 | Stop reason: SDUPTHER

## 2024-10-04 RX ORDER — MIDAZOLAM HYDROCHLORIDE 2 MG/2ML
2 INJECTION, SOLUTION INTRAMUSCULAR; INTRAVENOUS
Status: DISCONTINUED | OUTPATIENT
Start: 2024-10-04 | End: 2024-10-04 | Stop reason: HOSPADM

## 2024-10-04 RX ORDER — METOCLOPRAMIDE HYDROCHLORIDE 5 MG/ML
10 INJECTION INTRAMUSCULAR; INTRAVENOUS
Status: DISCONTINUED | OUTPATIENT
Start: 2024-10-04 | End: 2024-10-04 | Stop reason: HOSPADM

## 2024-10-04 RX ORDER — SODIUM CHLORIDE, SODIUM LACTATE, POTASSIUM CHLORIDE, CALCIUM CHLORIDE 600; 310; 30; 20 MG/100ML; MG/100ML; MG/100ML; MG/100ML
INJECTION, SOLUTION INTRAVENOUS
Status: DISCONTINUED | OUTPATIENT
Start: 2024-10-04 | End: 2024-10-04 | Stop reason: SDUPTHER

## 2024-10-04 RX ORDER — ONDANSETRON 2 MG/ML
4 INJECTION INTRAMUSCULAR; INTRAVENOUS
Status: DISCONTINUED | OUTPATIENT
Start: 2024-10-04 | End: 2024-10-04 | Stop reason: HOSPADM

## 2024-10-04 RX ORDER — DEXAMETHASONE SODIUM PHOSPHATE 10 MG/ML
INJECTION, SOLUTION INTRAMUSCULAR; INTRAVENOUS
Status: DISCONTINUED | OUTPATIENT
Start: 2024-10-04 | End: 2024-10-04 | Stop reason: SDUPTHER

## 2024-10-04 RX ORDER — FENTANYL CITRATE 50 UG/ML
INJECTION, SOLUTION INTRAMUSCULAR; INTRAVENOUS
Status: DISCONTINUED | OUTPATIENT
Start: 2024-10-04 | End: 2024-10-04 | Stop reason: SDUPTHER

## 2024-10-04 RX ORDER — DEXMEDETOMIDINE HYDROCHLORIDE 100 UG/ML
INJECTION, SOLUTION INTRAVENOUS
Status: DISCONTINUED | OUTPATIENT
Start: 2024-10-04 | End: 2024-10-04 | Stop reason: SDUPTHER

## 2024-10-04 RX ORDER — SODIUM CHLORIDE 0.9 % (FLUSH) 0.9 %
5-40 SYRINGE (ML) INJECTION PRN
Status: DISCONTINUED | OUTPATIENT
Start: 2024-10-04 | End: 2024-10-04 | Stop reason: HOSPADM

## 2024-10-04 RX ORDER — CEFAZOLIN SODIUM 1 G/3ML
INJECTION, POWDER, FOR SOLUTION INTRAMUSCULAR; INTRAVENOUS
Status: DISCONTINUED | OUTPATIENT
Start: 2024-10-04 | End: 2024-10-04 | Stop reason: SDUPTHER

## 2024-10-04 RX ORDER — OXYCODONE AND ACETAMINOPHEN 5; 325 MG/1; MG/1
1 TABLET ORAL
Status: DISCONTINUED | OUTPATIENT
Start: 2024-10-04 | End: 2024-10-04 | Stop reason: HOSPADM

## 2024-10-04 RX ORDER — IPRATROPIUM BROMIDE AND ALBUTEROL SULFATE 2.5; .5 MG/3ML; MG/3ML
1 SOLUTION RESPIRATORY (INHALATION)
Status: DISCONTINUED | OUTPATIENT
Start: 2024-10-04 | End: 2024-10-04 | Stop reason: HOSPADM

## 2024-10-04 RX ORDER — MEPERIDINE HYDROCHLORIDE 50 MG/ML
12.5 INJECTION INTRAMUSCULAR; INTRAVENOUS; SUBCUTANEOUS EVERY 5 MIN PRN
Status: DISCONTINUED | OUTPATIENT
Start: 2024-10-04 | End: 2024-10-04 | Stop reason: HOSPADM

## 2024-10-04 RX ORDER — LIDOCAINE HYDROCHLORIDE 10 MG/ML
INJECTION, SOLUTION INFILTRATION; PERINEURAL
Status: DISCONTINUED
Start: 2024-10-04 | End: 2024-10-04 | Stop reason: HOSPADM

## 2024-10-04 RX ORDER — ONDANSETRON 2 MG/ML
INJECTION INTRAMUSCULAR; INTRAVENOUS
Status: DISCONTINUED | OUTPATIENT
Start: 2024-10-04 | End: 2024-10-04 | Stop reason: SDUPTHER

## 2024-10-04 RX ADMIN — DEXMEDETOMIDINE HCL 8 MCG: 100 INJECTION INTRAVENOUS at 12:53

## 2024-10-04 RX ADMIN — DEXAMETHASONE SODIUM PHOSPHATE 10 MG: 10 INJECTION INTRAMUSCULAR; INTRAVENOUS at 12:54

## 2024-10-04 RX ADMIN — ONDANSETRON 4 MG: 2 INJECTION INTRAMUSCULAR; INTRAVENOUS at 13:12

## 2024-10-04 RX ADMIN — CEFAZOLIN 2 G: 1 INJECTION, POWDER, FOR SOLUTION INTRAMUSCULAR; INTRAVENOUS at 12:55

## 2024-10-04 RX ADMIN — PROPOFOL 100 MCG/KG/MIN: 10 INJECTION, EMULSION INTRAVENOUS at 12:42

## 2024-10-04 RX ADMIN — PROPOFOL 10 MG: 10 INJECTION, EMULSION INTRAVENOUS at 12:57

## 2024-10-04 RX ADMIN — SODIUM CHLORIDE, POTASSIUM CHLORIDE, SODIUM LACTATE AND CALCIUM CHLORIDE: 600; 310; 30; 20 INJECTION, SOLUTION INTRAVENOUS at 12:37

## 2024-10-04 RX ADMIN — PROPOFOL 50 MG: 10 INJECTION, EMULSION INTRAVENOUS at 12:42

## 2024-10-04 RX ADMIN — PROPOFOL 20 MG: 10 INJECTION, EMULSION INTRAVENOUS at 12:58

## 2024-10-04 RX ADMIN — DEXMEDETOMIDINE HCL 2 MCG: 100 INJECTION INTRAVENOUS at 12:38

## 2024-10-04 RX ADMIN — LIDOCAINE HYDROCHLORIDE 50 MG: 10 INJECTION, SOLUTION EPIDURAL; INFILTRATION; INTRACAUDAL; PERINEURAL at 12:41

## 2024-10-04 RX ADMIN — PROPOFOL 20 MG: 10 INJECTION, EMULSION INTRAVENOUS at 13:00

## 2024-10-04 RX ADMIN — FENTANYL CITRATE 50 MCG: 50 INJECTION, SOLUTION INTRAMUSCULAR; INTRAVENOUS at 12:39

## 2024-10-04 ASSESSMENT — PAIN - FUNCTIONAL ASSESSMENT
PAIN_FUNCTIONAL_ASSESSMENT: NONE - DENIES PAIN
PAIN_FUNCTIONAL_ASSESSMENT: 0-10

## 2024-10-04 NOTE — H&P
[facility]  Urology H&P Note     Patient:  Lucio Quiroga  MRN: [id number]  YOB: 1955    ATTENDING: Brayan Andersen Jr, MD     CHIEF COMPLAINT:  prostate cancer    HISTORY OF PRESENT ILLNESS:   The patient is a 69 y.o. male who presents with prostate cancer    Patient's old records, notes and chart reviewed and summarized above.     Past Medical History:    Past Medical History:   Diagnosis Date    Arthritis     L knee    ED (erectile dysfunction)     Elevated PSA     Hyperlipidemia     Hypertension     Kidney stones     Parkinson's disease (HCC)     Prostate cancer (HCC)     Under care of team     [facility], last seen 7/26/2024    Under care of team     [facility], last seen 12/28/2023    Under care of team     Urology, Dr. Lopez , last seen 6/25/2024    Wellness examination     PCP, Dr. Thorne, last seen 4/3/2023       Past Surgical History:    Past Surgical History:   Procedure Laterality Date    COLONOSCOPY  04/20/2012    CYSTO/URETERO/PYELOSCOPY, CALCULUS TX Left 10/11/2019    FORTEC HOLMIUM LASER, CYSTSCOPY, URETEROSCOPY, STENT PLACEMENT (FORTEC CONF# [id number] - LAURA) performed by Jayden Lopez MD at [facility] OR    CYSTOSCOPY Right 12/10/2020    CYSTOSCOPY,  URETERAL STENT INSERTION performed by Jayden Lopez MD at [facility] OR    HEMORRHOID SURGERY      PROSTATE BIOPSY  12/19/2017    PROSTATE BIOPSY  08/16/2024    PROSTATE SURGERY N/A 05/17/2023    SATURATION PROSTATE BIOPSY ULTRASOUND performed by Jayden Lopez MD at [facility] OR    PROSTATE SURGERY N/A 8/16/2024    SATURATION PROSTATE BIOPSY ULTRASOUND performed by Jayden Lopez MD at [facility] OR    SKIN BIOPSY      right ear biopsy       Medications Prior to Admission:   Prior to Admission medications    Medication Sig Start Date End Date Taking? Authorizing Provider   carbidopa-levodopa (SINEMET)  MG per tablet Take 1 tab at 9am, 1pm, 5pm and 9pm. Dispense for 90 days. 3/31/23  Yes Provider, MD Moisés

## 2024-10-04 NOTE — OP NOTE
FACILITY:  [facility]   DATE:  10/04/24  Lucio Quiroga  1955  [id number]     Surgeon: Dr. Brayan Andersen Jr, MD MD  Assistant: none  Preoperative diagnosis: Prostate cancer.  Postoperative diagnosis: Prostate cancer.   Procedure: Transrectal ultrasound of the prostate placement of SpaceOAR.  Anesthesia: MAC and local  Specimen: none  COMPLICATIONS:  None.  EBL: 1 cc  Drains: None  Follow-up: May continue to follow up with Rad Onc for radiation treatment    Indication:  Lucio Quiroga is a 69 y.o. male  with Prostate cancer and scheduled for radiation treatments. He is here today for SpaceOAR.  Risks benefits and alternatives goals and possible complications of the procedure were explained to the patient for consent was obtained.  He elected to proceed.    Details: Patient was brought back to the operating room table.  He was laid in the supine position.  EPC cuffs were placed on and functioning prior to induction of anesthesia.  Anesthesia was inducted. A timeout performed.  The ultrasound probe was placed per rectum.  The prostate was brought into view.   We identified the apex and base of the prostate and were found midline.  We then injected lidocaine into the perineum.  We then mixed the hydrogel.  We visualized the mid gland and fat plane.  We advanced the needle to the perirectal space, and hydrodisected with saline.  We visualized the lateral aspects and had a good space created.  We then injected the hydrogel into the space with a proper space filled.  There is no evidence of brisk bleeding per the rectum.  The patient tolerated the procedure well.  His anesthesia was reversed.  He was then taken to recovery in stable condition.  He was discharged home in stable condition and instructed to follow-up for review of his pathology results.  He is instructed to call if he has any fevers shaking chills.  The attending was present for the entire case.

## 2024-10-04 NOTE — DISCHARGE INSTRUCTIONS
Post op instructions: transrectal ultrasound guided SpaceOar  You may experience blood in stool, urine, and semen.  This should resolve over the next couple days.  Post operative infections can sometimes occur.  Please call if you develop fevers > 101F, or shaking chills like you have the flu.  Please continue the antibiotics as directed.      Tylenol for pain control  Pt ok to discharge home in good condition  No heavy lifting, >10 lbs for today  Pt should avoid strenuous activity for today  Pt should walk moderately at home  Pt ok to shower   Pt may resume diet as tolerated  Pt should take Rx as directed: cipro that was previously perscribed.  No driving while on narcotics  Please call attending physician or hospital  with questions  Call or Present to ED if fever (> 101F), intractable nausea vomiting or pain.     If taking, Please hold blood thinning medications for 1-3 days      Pt should follow up with radiation oncology.  Call to confirm appointment.    Activity  You have had anesthesia today  Do not drive, operate heavy equipment, consume alcoholic beverages, or make any important decisions  for 24 hours   If you are taking pain medication: Do not drive or consume alcohol.  Take your time changing positions today. You may feel light headed or dizzy if you move too quickly.   Continue your home medications as ordered by your physician.  Diet   You can eat your normal diet when you feel well. You should start off with bland foods like chicken soup, toast, or yogurt. Then advance as tolerated.  Drink plenty of fluids (unless your doctor tells you not to). Your urine should be very lightly colored without a strong odor.

## 2024-10-04 NOTE — ANESTHESIA PRE PROCEDURE
Allergies    Problem List:  There is no problem list on file for this patient.      Past Medical History:        Diagnosis Date   • Arthritis     L knee   • ED (erectile dysfunction)    • Elevated PSA    • Hyperlipidemia    • Hypertension    • Kidney stones    • Parkinson's disease (HCC)    • Prostate cancer (HCC)    • Under care of team     [facility], last seen 7/26/2024   • Under care of team     [facility], last seen 12/28/2023   • Under care of team     Urology, Dr. Lopez , last seen 6/25/2024   • Wellness examination     PCP, Dr. Thorne, last seen 4/3/2023       Past Surgical History:        Procedure Laterality Date   • COLONOSCOPY  04/20/2012   • CYSTO/URETERO/PYELOSCOPY, CALCULUS TX Left 10/11/2019    FORTEC HOLMIUM LASER, CYSTSCOPY, URETEROSCOPY, STENT PLACEMENT (FORTEC CONF# [id number] - LAURA) performed by Jayden Lopez MD at [facility] OR   • CYSTOSCOPY Right 12/10/2020    CYSTOSCOPY,  URETERAL STENT INSERTION performed by Jayden Lopez MD at [facility] OR   • HEMORRHOID SURGERY     • PROSTATE BIOPSY  12/19/2017   • PROSTATE BIOPSY  08/16/2024   • PROSTATE SURGERY N/A 05/17/2023    SATURATION PROSTATE BIOPSY ULTRASOUND performed by Jayden Lopez MD at [facility] OR   • PROSTATE SURGERY N/A 8/16/2024    SATURATION PROSTATE BIOPSY ULTRASOUND performed by Jayden Lopez MD at [facility] OR   • SKIN BIOPSY      right ear biopsy       Social History:    Social History     Tobacco Use   • Smoking status: Never   • Smokeless tobacco: Never   Substance Use Topics   • Alcohol use: Never                                Counseling given: Not Answered      Vital Signs (Current):   Vitals:    10/04/24 1127   BP: (!) 165/80   Pulse: 68   Resp: 17   Temp: 98.1 °F (36.7 °C)   TempSrc: Temporal   SpO2: 99%   Weight: 99.3 kg (219 lb)   Height: 1.854 m (6' 1\")                                              BP Readings from Last 3 Encounters:   10/04/24 (!) 165/80   08/16/24 (!) 163/88   05/17/23 (!) 148/71       NPO

## 2024-10-04 NOTE — ANESTHESIA POSTPROCEDURE EVALUATION
Department of Anesthesiology  Postprocedure Note    Patient: Lucio Quiroga  MRN: [id number]  YOB: 1955  Date of evaluation: 10/4/2024    Procedure Summary       Date: 10/04/24 Room / Location: [address]    Anesthesia Start: 1237 Anesthesia Stop: 1315    Procedure: SPACE OAR (Anus) Diagnosis:       Prostate cancer (HCC)      (Prostate cancer (HCC) [C61])    Surgeons: Brayan Andersen Jr., MD Responsible Provider: Manny Burt MD    Anesthesia Type: MAC ASA Status: 3            Anesthesia Type: No value filed.    Ventura Phase I: Ventura Score: 10    Ventura Phase II: Ventura Score: 8    Anesthesia Post Evaluation    Patient location during evaluation: PACU  Patient participation: complete - patient participated  Level of consciousness: awake and alert  Airway patency: patent  Nausea & Vomiting: no nausea and no vomiting  Cardiovascular status: hemodynamically stable  Respiratory status: room air and spontaneous ventilation  Hydration status: euvolemic  Multimodal analgesia pain management approach  Pain management: adequate    No notable events documented.

## 2025-01-07 LAB — PROSTATE SPECIFIC ANTIGEN: 4.43 NG/ML

## 2025-07-08 LAB — PROSTATE SPECIFIC ANTIGEN: 2.59 NG/ML

## (undated) DEVICE — GLOVE ORANGE PI 7 1/2   MSG9075

## (undated) DEVICE — DRAPE,REIN 53X77,STERILE: Brand: MEDLINE

## (undated) DEVICE — SINGLE ACTION PUMPING SYSTEM

## (undated) DEVICE — MAX-CORE® DISPOSABLE CORE BIOPSY INSTRUMENT, 18G X 20CM: Brand: MAX-CORE

## (undated) DEVICE — SPINAL BIOPSY NEEDLE 22GA X 20CM: Brand: SPINAL BIOPSY NEEDLE

## (undated) DEVICE — GUIDEWIRE URO L150CM DIA0.035IN STIFF NIT HYDRPHLC STR TIP

## (undated) DEVICE — SYRINGE ONLY,20ML LUER LOCK: Brand: MEDLINE INDUSTRIES, INC.

## (undated) DEVICE — 3M™ STERI-STRIP™ COMPOUND BENZOIN TINCTURE 40 BAGS/CARTON 4 CARTONS/CASE C1544: Brand: 3M™ STERI-STRIP™

## (undated) DEVICE — GARMENT COMPR STD FOR 17IN CALF UNIF THER FLOTRN

## (undated) DEVICE — NEEDLE SPNL 22GA L7IN SPINOCAN

## (undated) DEVICE — NEEDLE BX ASPIR SPNL TIPCM MRK AND NDL STP 22GAX20CM

## (undated) DEVICE — PREMIUM DRY TRAY LF: Brand: MEDLINE INDUSTRIES, INC.

## (undated) DEVICE — PACK PROCEDURE SURG CYSTO SVMMC LF

## (undated) DEVICE — ADAPTER URO SCP UROLOK LL

## (undated) DEVICE — DUAL LUMEN URETERAL CATHETER

## (undated) DEVICE — 3M™ IOBAN™ 2 ANTIMICROBIAL INCISE DRAPE 6650EZ: Brand: IOBAN™ 2

## (undated) DEVICE — DRAINBAG,ANTI-REFLUX TOWER,L/F,2000ML,LL: Brand: MEDLINE

## (undated) DEVICE — STRIP,CLOSURE,WOUND,MEDI-STRIP,1/2X4: Brand: MEDLINE

## (undated) DEVICE — MARKER,SKIN,WI/RULER AND LABELS: Brand: MEDLINE

## (undated) DEVICE — TOWEL,OR,DSP,ST,NATURAL,DLX,4/PK,20PK/CS: Brand: MEDLINE

## (undated) DEVICE — BLADE CLIPPER SURG SENSICLIP

## (undated) DEVICE — COVER,MAYO STAND,STERILE: Brand: MEDLINE

## (undated) DEVICE — SOLUTION SCRB 4OZ 4% CHG H2O AIDED FOR PREOPERATIVE SKIN

## (undated) DEVICE — CATHETER,FOLEY,3-WAY,22FR,30ML,100%SILI: Brand: MEDLINE

## (undated) DEVICE — MONOPTY® DISPOSABLE CORE BIOPSY INSTRUMENT, 22MM PENETRATION DEPTH, 18G X 20CM: Brand: MONOPTY

## (undated) DEVICE — GLOVE ORANGE PI 7   MSG9070

## (undated) DEVICE — STRAP ARMBRD W1.5XL32IN FOAM STR YET SFT W/ HK AND LOOP

## (undated) DEVICE — GOWN,AURORA,NONREINFORCED,LARGE: Brand: MEDLINE

## (undated) DEVICE — CONTAINER,SPECIMEN,4OZ,OR STRL: Brand: MEDLINE

## (undated) DEVICE — RENTAL LASER HOLMIUM HIGH WATTAGE CASE

## (undated) DEVICE — STRAP,CATHETER,ELASTIC,HOOK&LOOP: Brand: MEDLINE

## (undated) DEVICE — CATHETER FOL 2 W 20 FR 5 CC URETH SPEC TIEM BARDX IC

## (undated) DEVICE — Device